# Patient Record
Sex: FEMALE | Race: WHITE | Employment: OTHER | URBAN - METROPOLITAN AREA
[De-identification: names, ages, dates, MRNs, and addresses within clinical notes are randomized per-mention and may not be internally consistent; named-entity substitution may affect disease eponyms.]

---

## 2019-05-24 ENCOUNTER — HOSPITAL ENCOUNTER (OUTPATIENT)
Dept: PREADMISSION TESTING | Age: 74
Discharge: HOME | End: 2019-05-24
Attending: OBSTETRICS & GYNECOLOGY
Payer: MEDICARE

## 2019-05-24 DIAGNOSIS — Z01.818 OTHER SPECIFIED PRE-OPERATIVE EXAMINATION: ICD-10-CM

## 2019-05-24 DIAGNOSIS — N95.0 POSTMENOPAUSAL BLEEDING: ICD-10-CM

## 2019-05-24 DIAGNOSIS — Z01.812 BLOOD TESTS PRIOR TO TREATMENT OR PROCEDURE: ICD-10-CM

## 2019-05-24 LAB
ATRIAL RATE: 72 BPM
CALCULATED P AXIS, ECG09: 79 DEGREES
CALCULATED R AXIS, ECG10: 83 DEGREES
CALCULATED T AXIS, ECG11: 90 DEGREES
DIAGNOSIS, 93000: NORMAL
HCT VFR BLD AUTO: 33.6 % (ref 35–45)
HGB BLD-MCNC: 10.3 G/DL (ref 12–16)
P-R INTERVAL, ECG05: 136 MS
POTASSIUM SERPL-SCNC: 4.7 MMOL/L (ref 3.5–5.5)
Q-T INTERVAL, ECG07: 390 MS
QRS DURATION, ECG06: 82 MS
QTC CALCULATION (BEZET), ECG08: 427 MS
VENTRICULAR RATE, ECG03: 72 BPM

## 2019-05-24 PROCEDURE — 84132 ASSAY OF SERUM POTASSIUM: CPT

## 2019-05-24 PROCEDURE — 85018 HEMOGLOBIN: CPT

## 2019-05-24 PROCEDURE — 36415 COLL VENOUS BLD VENIPUNCTURE: CPT

## 2019-05-24 PROCEDURE — 93005 ELECTROCARDIOGRAM TRACING: CPT

## 2019-05-27 LAB
FAX TO INFO,FAXT: NORMAL
FAX TO NUMBER,FAXN: NORMAL

## 2019-05-28 ENCOUNTER — HOSPITAL ENCOUNTER (OUTPATIENT)
Dept: LAB | Age: 74
Discharge: HOME | End: 2019-05-28
Payer: MEDICARE

## 2019-05-28 PROCEDURE — 88305 TISSUE EXAM BY PATHOLOGIST: CPT

## 2020-03-13 ENCOUNTER — HOSPITAL ENCOUNTER (OUTPATIENT)
Dept: PREADMISSION TESTING | Age: 75
Discharge: HOME | End: 2020-03-13
Payer: MEDICARE

## 2020-03-13 DIAGNOSIS — E04.1 NONTOXIC UNINODULAR GOITER: ICD-10-CM

## 2020-03-13 LAB
ANION GAP SERPL CALC-SCNC: 2 MMOL/L (ref 3–18)
BASOPHILS # BLD: 0 K/UL (ref 0–0.1)
BASOPHILS NFR BLD: 0 % (ref 0–2)
BUN SERPL-MCNC: 33 MG/DL (ref 7–18)
BUN/CREAT SERPL: 40 (ref 12–20)
CALCIUM SERPL-MCNC: 9.2 MG/DL (ref 8.5–10.1)
CHLORIDE SERPL-SCNC: 113 MMOL/L (ref 100–111)
CO2 SERPL-SCNC: 29 MMOL/L (ref 21–32)
CREAT SERPL-MCNC: 0.82 MG/DL (ref 0.6–1.3)
DIFFERENTIAL METHOD BLD: ABNORMAL
EOSINOPHIL # BLD: 0.1 K/UL (ref 0–0.4)
EOSINOPHIL NFR BLD: 1 % (ref 0–5)
ERYTHROCYTE [DISTWIDTH] IN BLOOD BY AUTOMATED COUNT: 14.7 % (ref 11.6–14.5)
GLUCOSE SERPL-MCNC: 96 MG/DL (ref 74–99)
HCT VFR BLD AUTO: 32.8 % (ref 35–45)
HGB BLD-MCNC: 10.1 G/DL (ref 12–16)
LYMPHOCYTES # BLD: 1 K/UL (ref 0.9–3.6)
LYMPHOCYTES NFR BLD: 18 % (ref 21–52)
MCH RBC QN AUTO: 29.7 PG (ref 24–34)
MCHC RBC AUTO-ENTMCNC: 30.8 G/DL (ref 31–37)
MCV RBC AUTO: 96.5 FL (ref 74–97)
MONOCYTES # BLD: 0.4 K/UL (ref 0.05–1.2)
MONOCYTES NFR BLD: 8 % (ref 3–10)
NEUTS SEG # BLD: 4.2 K/UL (ref 1.8–8)
NEUTS SEG NFR BLD: 73 % (ref 40–73)
PLATELET # BLD AUTO: 214 K/UL (ref 135–420)
PMV BLD AUTO: 9.2 FL (ref 9.2–11.8)
POTASSIUM SERPL-SCNC: 5.3 MMOL/L (ref 3.5–5.5)
RBC # BLD AUTO: 3.4 M/UL (ref 4.2–5.3)
SODIUM SERPL-SCNC: 144 MMOL/L (ref 136–145)
WBC # BLD AUTO: 5.7 K/UL (ref 4.6–13.2)

## 2020-03-13 PROCEDURE — 36415 COLL VENOUS BLD VENIPUNCTURE: CPT

## 2020-03-13 PROCEDURE — 80048 BASIC METABOLIC PNL TOTAL CA: CPT

## 2020-03-13 PROCEDURE — 85025 COMPLETE CBC W/AUTO DIFF WBC: CPT

## 2020-03-21 ENCOUNTER — ANESTHESIA EVENT (OUTPATIENT)
Dept: SURGERY | Age: 75
DRG: 627 | End: 2020-03-21
Payer: MEDICARE

## 2020-03-25 ENCOUNTER — HOSPITAL ENCOUNTER (OUTPATIENT)
Dept: PREADMISSION TESTING | Age: 75
Discharge: HOME | End: 2020-03-25
Payer: MEDICARE

## 2020-03-25 LAB
ATRIAL RATE: 64 BPM
CALCULATED P AXIS, ECG09: 79 DEGREES
CALCULATED R AXIS, ECG10: 84 DEGREES
CALCULATED T AXIS, ECG11: 90 DEGREES
DIAGNOSIS, 93000: NORMAL
P-R INTERVAL, ECG05: 152 MS
Q-T INTERVAL, ECG07: 392 MS
QRS DURATION, ECG06: 88 MS
QTC CALCULATION (BEZET), ECG08: 404 MS
VENTRICULAR RATE, ECG03: 64 BPM

## 2020-03-25 PROCEDURE — 93005 ELECTROCARDIOGRAM TRACING: CPT

## 2020-03-27 ENCOUNTER — HOSPITAL ENCOUNTER (INPATIENT)
Age: 75
LOS: 1 days | Discharge: HOME | DRG: 627 | End: 2020-03-29
Attending: OTOLARYNGOLOGY | Admitting: OTOLARYNGOLOGY
Payer: MEDICARE

## 2020-03-27 ENCOUNTER — ANESTHESIA (OUTPATIENT)
Dept: SURGERY | Age: 75
DRG: 627 | End: 2020-03-27
Payer: MEDICARE

## 2020-03-27 DIAGNOSIS — C73 THYROID CARCINOMA (HCC): Primary | ICD-10-CM

## 2020-03-27 PROBLEM — R09.02 HYPOXEMIA: Status: ACTIVE | Noted: 2020-03-27

## 2020-03-27 PROBLEM — I73.9 PVD (PERIPHERAL VASCULAR DISEASE) (HCC): Status: ACTIVE | Noted: 2020-03-27

## 2020-03-27 PROBLEM — K21.00 GASTROESOPHAGEAL REFLUX DISEASE WITH ESOPHAGITIS: Status: ACTIVE | Noted: 2020-03-27

## 2020-03-27 PROBLEM — Z98.890 STATUS POST THYROID SURGERY: Status: ACTIVE | Noted: 2020-03-27

## 2020-03-27 LAB
ALBUMIN SERPL-MCNC: 3 G/DL (ref 3.4–5)
ANION GAP SERPL CALC-SCNC: 5 MMOL/L (ref 3–18)
BUN SERPL-MCNC: 24 MG/DL (ref 7–18)
BUN/CREAT SERPL: 27 (ref 12–20)
CA-I SERPL-SCNC: 1.18 MMOL/L (ref 1.12–1.32)
CALCIUM SERPL-MCNC: 8.3 MG/DL (ref 8.5–10.1)
CALCIUM SERPL-MCNC: 8.4 MG/DL (ref 8.5–10.1)
CALCIUM SERPL-MCNC: 8.6 MG/DL (ref 8.5–10.1)
CHLORIDE SERPL-SCNC: 110 MMOL/L (ref 100–111)
CO2 SERPL-SCNC: 27 MMOL/L (ref 21–32)
CREAT SERPL-MCNC: 0.9 MG/DL (ref 0.6–1.3)
GLUCOSE SERPL-MCNC: 111 MG/DL (ref 74–99)
PHOSPHATE SERPL-MCNC: 4.3 MG/DL (ref 2.5–4.9)
POTASSIUM SERPL-SCNC: 4.4 MMOL/L (ref 3.5–5.5)
PTH-INTACT SERPL-MCNC: 12 PG/ML (ref 18.4–88)
SODIUM SERPL-SCNC: 142 MMOL/L (ref 136–145)

## 2020-03-27 PROCEDURE — 88311 DECALCIFY TISSUE: CPT

## 2020-03-27 PROCEDURE — 77030012407 HC DRN WND BARD -B: Performed by: OTOLARYNGOLOGY

## 2020-03-27 PROCEDURE — 74011000250 HC RX REV CODE- 250: Performed by: NURSE ANESTHETIST, CERTIFIED REGISTERED

## 2020-03-27 PROCEDURE — 74011250636 HC RX REV CODE- 250/636: Performed by: NURSE ANESTHETIST, CERTIFIED REGISTERED

## 2020-03-27 PROCEDURE — 77030018836 HC SOL IRR NACL ICUM -A: Performed by: OTOLARYNGOLOGY

## 2020-03-27 PROCEDURE — 74011250636 HC RX REV CODE- 250/636: Performed by: OTOLARYNGOLOGY

## 2020-03-27 PROCEDURE — 77030031139 HC SUT VCRL2 J&J -A: Performed by: OTOLARYNGOLOGY

## 2020-03-27 PROCEDURE — 77030031753 HC SHR ENDO COAG HARM J&J -E: Performed by: OTOLARYNGOLOGY

## 2020-03-27 PROCEDURE — 77030010512 HC APPL CLP LIG J&J -C: Performed by: OTOLARYNGOLOGY

## 2020-03-27 PROCEDURE — 74011250637 HC RX REV CODE- 250/637: Performed by: OTOLARYNGOLOGY

## 2020-03-27 PROCEDURE — 99218 HC RM OBSERVATION: CPT

## 2020-03-27 PROCEDURE — 76010000131 HC OR TIME 2 TO 2.5 HR: Performed by: OTOLARYNGOLOGY

## 2020-03-27 PROCEDURE — 94640 AIRWAY INHALATION TREATMENT: CPT

## 2020-03-27 PROCEDURE — 82310 ASSAY OF CALCIUM: CPT

## 2020-03-27 PROCEDURE — 77030040506 HC DRN WND MDII -A: Performed by: OTOLARYNGOLOGY

## 2020-03-27 PROCEDURE — 74011250637 HC RX REV CODE- 250/637: Performed by: INTERNAL MEDICINE

## 2020-03-27 PROCEDURE — 36415 COLL VENOUS BLD VENIPUNCTURE: CPT

## 2020-03-27 PROCEDURE — 0GTK0ZZ RESECTION OF THYROID GLAND, OPEN APPROACH: ICD-10-PCS | Performed by: OTOLARYNGOLOGY

## 2020-03-27 PROCEDURE — 77030020782 HC GWN BAIR PAWS FLX 3M -B: Performed by: OTOLARYNGOLOGY

## 2020-03-27 PROCEDURE — 77030013140 HC MSK NEB VYRM -A

## 2020-03-27 PROCEDURE — 74011250636 HC RX REV CODE- 250/636: Performed by: INTERNAL MEDICINE

## 2020-03-27 PROCEDURE — 82330 ASSAY OF CALCIUM: CPT

## 2020-03-27 PROCEDURE — 07B20ZZ EXCISION OF LEFT NECK LYMPHATIC, OPEN APPROACH: ICD-10-PCS | Performed by: OTOLARYNGOLOGY

## 2020-03-27 PROCEDURE — 77030002996 HC SUT SLK J&J -A: Performed by: OTOLARYNGOLOGY

## 2020-03-27 PROCEDURE — 77030040361 HC SLV COMPR DVT MDII -B: Performed by: OTOLARYNGOLOGY

## 2020-03-27 PROCEDURE — 80069 RENAL FUNCTION PANEL: CPT

## 2020-03-27 PROCEDURE — 77030008462 HC STPLR SKN PROX J&J -A: Performed by: OTOLARYNGOLOGY

## 2020-03-27 PROCEDURE — 77030002916 HC SUT ETHLN J&J -A: Performed by: OTOLARYNGOLOGY

## 2020-03-27 PROCEDURE — 83970 ASSAY OF PARATHORMONE: CPT

## 2020-03-27 PROCEDURE — 07B10ZZ EXCISION OF RIGHT NECK LYMPHATIC, OPEN APPROACH: ICD-10-PCS | Performed by: OTOLARYNGOLOGY

## 2020-03-27 PROCEDURE — 74011000250 HC RX REV CODE- 250: Performed by: OTOLARYNGOLOGY

## 2020-03-27 PROCEDURE — 74011000272 HC RX REV CODE- 272: Performed by: OTOLARYNGOLOGY

## 2020-03-27 PROCEDURE — 74011000250 HC RX REV CODE- 250: Performed by: INTERNAL MEDICINE

## 2020-03-27 PROCEDURE — 76210000006 HC OR PH I REC 0.5 TO 1 HR: Performed by: OTOLARYNGOLOGY

## 2020-03-27 PROCEDURE — 76060000035 HC ANESTHESIA 2 TO 2.5 HR: Performed by: OTOLARYNGOLOGY

## 2020-03-27 PROCEDURE — 88307 TISSUE EXAM BY PATHOLOGIST: CPT

## 2020-03-27 RX ORDER — AMLODIPINE BESYLATE 5 MG/1
5 TABLET ORAL
Status: COMPLETED | OUTPATIENT
Start: 2020-03-27 | End: 2020-03-27

## 2020-03-27 RX ORDER — FERROUS SULFATE, DRIED 160(50) MG
1 TABLET, EXTENDED RELEASE ORAL DAILY
Status: DISCONTINUED | OUTPATIENT
Start: 2020-03-27 | End: 2020-03-29 | Stop reason: HOSPADM

## 2020-03-27 RX ORDER — MIDAZOLAM HYDROCHLORIDE 1 MG/ML
INJECTION, SOLUTION INTRAMUSCULAR; INTRAVENOUS AS NEEDED
Status: DISCONTINUED | OUTPATIENT
Start: 2020-03-27 | End: 2020-03-27 | Stop reason: HOSPADM

## 2020-03-27 RX ORDER — HYDROMORPHONE HYDROCHLORIDE 1 MG/ML
0.2 INJECTION, SOLUTION INTRAMUSCULAR; INTRAVENOUS; SUBCUTANEOUS
Status: DISCONTINUED | OUTPATIENT
Start: 2020-03-27 | End: 2020-03-29 | Stop reason: HOSPADM

## 2020-03-27 RX ORDER — PHENYLEPHRINE HCL IN 0.9% NACL 1 MG/10 ML
SYRINGE (ML) INTRAVENOUS AS NEEDED
Status: DISCONTINUED | OUTPATIENT
Start: 2020-03-27 | End: 2020-03-27 | Stop reason: HOSPADM

## 2020-03-27 RX ORDER — ATORVASTATIN CALCIUM 20 MG/1
40 TABLET, FILM COATED ORAL
Status: DISCONTINUED | OUTPATIENT
Start: 2020-03-27 | End: 2020-03-29 | Stop reason: HOSPADM

## 2020-03-27 RX ORDER — SODIUM CHLORIDE 0.9 % (FLUSH) 0.9 %
5-40 SYRINGE (ML) INJECTION EVERY 8 HOURS
Status: DISCONTINUED | OUTPATIENT
Start: 2020-03-27 | End: 2020-03-29 | Stop reason: HOSPADM

## 2020-03-27 RX ORDER — AMLODIPINE BESYLATE 5 MG/1
5 TABLET ORAL DAILY
Status: DISCONTINUED | OUTPATIENT
Start: 2020-03-27 | End: 2020-03-27

## 2020-03-27 RX ORDER — DEXAMETHASONE SODIUM PHOSPHATE 4 MG/ML
INJECTION, SOLUTION INTRA-ARTICULAR; INTRALESIONAL; INTRAMUSCULAR; INTRAVENOUS; SOFT TISSUE AS NEEDED
Status: DISCONTINUED | OUTPATIENT
Start: 2020-03-27 | End: 2020-03-27 | Stop reason: HOSPADM

## 2020-03-27 RX ORDER — MAGNESIUM SULFATE 100 %
4 CRYSTALS MISCELLANEOUS AS NEEDED
Status: DISCONTINUED | OUTPATIENT
Start: 2020-03-27 | End: 2020-03-27 | Stop reason: HOSPADM

## 2020-03-27 RX ORDER — CALCIUM CARBONATE 500(1250)
1500 TABLET ORAL
Status: DISCONTINUED | OUTPATIENT
Start: 2020-03-27 | End: 2020-03-29 | Stop reason: HOSPADM

## 2020-03-27 RX ORDER — AMLODIPINE BESYLATE 5 MG/1
5 TABLET ORAL DAILY
Status: DISCONTINUED | OUTPATIENT
Start: 2020-03-28 | End: 2020-03-29 | Stop reason: HOSPADM

## 2020-03-27 RX ORDER — HYDRALAZINE HYDROCHLORIDE 20 MG/ML
10 INJECTION INTRAMUSCULAR; INTRAVENOUS
Status: DISCONTINUED | OUTPATIENT
Start: 2020-03-27 | End: 2020-03-29 | Stop reason: HOSPADM

## 2020-03-27 RX ORDER — FENTANYL CITRATE 50 UG/ML
INJECTION, SOLUTION INTRAMUSCULAR; INTRAVENOUS AS NEEDED
Status: DISCONTINUED | OUTPATIENT
Start: 2020-03-27 | End: 2020-03-27 | Stop reason: HOSPADM

## 2020-03-27 RX ORDER — HYDROMORPHONE HYDROCHLORIDE 1 MG/ML
0.5 INJECTION, SOLUTION INTRAMUSCULAR; INTRAVENOUS; SUBCUTANEOUS
Status: DISCONTINUED | OUTPATIENT
Start: 2020-03-27 | End: 2020-03-27 | Stop reason: HOSPADM

## 2020-03-27 RX ORDER — NALOXONE HYDROCHLORIDE 0.4 MG/ML
0.4 INJECTION, SOLUTION INTRAMUSCULAR; INTRAVENOUS; SUBCUTANEOUS AS NEEDED
Status: DISCONTINUED | OUTPATIENT
Start: 2020-03-27 | End: 2020-03-29 | Stop reason: HOSPADM

## 2020-03-27 RX ORDER — GLYCOPYRROLATE 0.2 MG/ML
INJECTION INTRAMUSCULAR; INTRAVENOUS AS NEEDED
Status: DISCONTINUED | OUTPATIENT
Start: 2020-03-27 | End: 2020-03-27 | Stop reason: HOSPADM

## 2020-03-27 RX ORDER — FENTANYL CITRATE 50 UG/ML
25 INJECTION, SOLUTION INTRAMUSCULAR; INTRAVENOUS AS NEEDED
Status: DISCONTINUED | OUTPATIENT
Start: 2020-03-27 | End: 2020-03-27 | Stop reason: HOSPADM

## 2020-03-27 RX ORDER — CARVEDILOL 3.12 MG/1
6.25 TABLET ORAL
Status: COMPLETED | OUTPATIENT
Start: 2020-03-27 | End: 2020-03-27

## 2020-03-27 RX ORDER — FLUMAZENIL 0.1 MG/ML
0.2 INJECTION INTRAVENOUS
Status: DISCONTINUED | OUTPATIENT
Start: 2020-03-27 | End: 2020-03-27 | Stop reason: HOSPADM

## 2020-03-27 RX ORDER — ROCURONIUM BROMIDE 10 MG/ML
INJECTION, SOLUTION INTRAVENOUS AS NEEDED
Status: DISCONTINUED | OUTPATIENT
Start: 2020-03-27 | End: 2020-03-27 | Stop reason: HOSPADM

## 2020-03-27 RX ORDER — LIDOCAINE HYDROCHLORIDE 20 MG/ML
INJECTION, SOLUTION EPIDURAL; INFILTRATION; INTRACAUDAL; PERINEURAL AS NEEDED
Status: DISCONTINUED | OUTPATIENT
Start: 2020-03-27 | End: 2020-03-27 | Stop reason: HOSPADM

## 2020-03-27 RX ORDER — FERROUS SULFATE, DRIED 160(50) MG
1 TABLET, EXTENDED RELEASE ORAL DAILY
Status: DISCONTINUED | OUTPATIENT
Start: 2020-03-28 | End: 2020-03-27

## 2020-03-27 RX ORDER — SODIUM CHLORIDE 0.9 % (FLUSH) 0.9 %
5-40 SYRINGE (ML) INJECTION EVERY 8 HOURS
Status: DISCONTINUED | OUTPATIENT
Start: 2020-03-27 | End: 2020-03-27 | Stop reason: HOSPADM

## 2020-03-27 RX ORDER — CALCIUM CARBONATE 500(1250)
1500 TABLET ORAL
Status: DISCONTINUED | OUTPATIENT
Start: 2020-03-28 | End: 2020-03-27

## 2020-03-27 RX ORDER — NALOXONE HYDROCHLORIDE 0.4 MG/ML
0.1 INJECTION, SOLUTION INTRAMUSCULAR; INTRAVENOUS; SUBCUTANEOUS AS NEEDED
Status: DISCONTINUED | OUTPATIENT
Start: 2020-03-27 | End: 2020-03-27 | Stop reason: HOSPADM

## 2020-03-27 RX ORDER — LEVOTHYROXINE SODIUM 100 UG/1
100 TABLET ORAL DAILY
Status: DISCONTINUED | OUTPATIENT
Start: 2020-03-27 | End: 2020-03-29 | Stop reason: HOSPADM

## 2020-03-27 RX ORDER — SODIUM CHLORIDE, SODIUM LACTATE, POTASSIUM CHLORIDE, CALCIUM CHLORIDE 600; 310; 30; 20 MG/100ML; MG/100ML; MG/100ML; MG/100ML
125 INJECTION, SOLUTION INTRAVENOUS CONTINUOUS
Status: DISCONTINUED | OUTPATIENT
Start: 2020-03-27 | End: 2020-03-27

## 2020-03-27 RX ORDER — IPRATROPIUM BROMIDE AND ALBUTEROL SULFATE 2.5; .5 MG/3ML; MG/3ML
3 SOLUTION RESPIRATORY (INHALATION)
Status: DISCONTINUED | OUTPATIENT
Start: 2020-03-27 | End: 2020-03-29 | Stop reason: HOSPADM

## 2020-03-27 RX ORDER — CARVEDILOL 3.12 MG/1
6.25 TABLET ORAL 2 TIMES DAILY WITH MEALS
Status: DISCONTINUED | OUTPATIENT
Start: 2020-03-27 | End: 2020-03-29 | Stop reason: HOSPADM

## 2020-03-27 RX ORDER — ONDANSETRON 2 MG/ML
4 INJECTION INTRAMUSCULAR; INTRAVENOUS
Status: DISCONTINUED | OUTPATIENT
Start: 2020-03-27 | End: 2020-03-28

## 2020-03-27 RX ORDER — IPRATROPIUM BROMIDE AND ALBUTEROL SULFATE 2.5; .5 MG/3ML; MG/3ML
3 SOLUTION RESPIRATORY (INHALATION) 2 TIMES DAILY
Status: DISCONTINUED | OUTPATIENT
Start: 2020-03-27 | End: 2020-03-27

## 2020-03-27 RX ORDER — GABAPENTIN 100 MG/1
100 CAPSULE ORAL 3 TIMES DAILY
Status: DISCONTINUED | OUTPATIENT
Start: 2020-03-27 | End: 2020-03-29 | Stop reason: HOSPADM

## 2020-03-27 RX ORDER — SODIUM CHLORIDE, SODIUM LACTATE, POTASSIUM CHLORIDE, CALCIUM CHLORIDE 600; 310; 30; 20 MG/100ML; MG/100ML; MG/100ML; MG/100ML
1000 INJECTION, SOLUTION INTRAVENOUS CONTINUOUS
Status: DISCONTINUED | OUTPATIENT
Start: 2020-03-27 | End: 2020-03-27 | Stop reason: HOSPADM

## 2020-03-27 RX ORDER — HYDROCODONE BITARTRATE AND ACETAMINOPHEN 5; 325 MG/1; MG/1
1 TABLET ORAL
Status: DISCONTINUED | OUTPATIENT
Start: 2020-03-27 | End: 2020-03-29 | Stop reason: HOSPADM

## 2020-03-27 RX ORDER — LANOLIN ALCOHOL/MO/W.PET/CERES
400 CREAM (GRAM) TOPICAL DAILY
Status: DISCONTINUED | OUTPATIENT
Start: 2020-03-28 | End: 2020-03-29 | Stop reason: HOSPADM

## 2020-03-27 RX ORDER — SODIUM CHLORIDE 0.9 % (FLUSH) 0.9 %
5-40 SYRINGE (ML) INJECTION AS NEEDED
Status: DISCONTINUED | OUTPATIENT
Start: 2020-03-27 | End: 2020-03-27 | Stop reason: HOSPADM

## 2020-03-27 RX ORDER — SODIUM CHLORIDE 0.9 % (FLUSH) 0.9 %
5-40 SYRINGE (ML) INJECTION AS NEEDED
Status: DISCONTINUED | OUTPATIENT
Start: 2020-03-27 | End: 2020-03-29 | Stop reason: HOSPADM

## 2020-03-27 RX ORDER — DEXMEDETOMIDINE HYDROCHLORIDE 100 UG/ML
INJECTION, SOLUTION INTRAVENOUS AS NEEDED
Status: DISCONTINUED | OUTPATIENT
Start: 2020-03-27 | End: 2020-03-27 | Stop reason: HOSPADM

## 2020-03-27 RX ORDER — SODIUM CHLORIDE, SODIUM LACTATE, POTASSIUM CHLORIDE, CALCIUM CHLORIDE 600; 310; 30; 20 MG/100ML; MG/100ML; MG/100ML; MG/100ML
75 INJECTION, SOLUTION INTRAVENOUS CONTINUOUS
Status: DISCONTINUED | OUTPATIENT
Start: 2020-03-27 | End: 2020-03-29 | Stop reason: HOSPADM

## 2020-03-27 RX ORDER — ONDANSETRON 2 MG/ML
INJECTION INTRAMUSCULAR; INTRAVENOUS AS NEEDED
Status: DISCONTINUED | OUTPATIENT
Start: 2020-03-27 | End: 2020-03-27 | Stop reason: HOSPADM

## 2020-03-27 RX ORDER — NEOSTIGMINE METHYLSULFATE 1 MG/ML
INJECTION, SOLUTION INTRAVENOUS AS NEEDED
Status: DISCONTINUED | OUTPATIENT
Start: 2020-03-27 | End: 2020-03-27 | Stop reason: HOSPADM

## 2020-03-27 RX ORDER — PANTOPRAZOLE SODIUM 40 MG/1
40 TABLET, DELAYED RELEASE ORAL
Status: DISCONTINUED | OUTPATIENT
Start: 2020-03-28 | End: 2020-03-29 | Stop reason: HOSPADM

## 2020-03-27 RX ORDER — BACITRACIN 500 [USP'U]/G
OINTMENT TOPICAL AS NEEDED
Status: DISCONTINUED | OUTPATIENT
Start: 2020-03-27 | End: 2020-03-27 | Stop reason: HOSPADM

## 2020-03-27 RX ORDER — PROPOFOL 10 MG/ML
INJECTION, EMULSION INTRAVENOUS AS NEEDED
Status: DISCONTINUED | OUTPATIENT
Start: 2020-03-27 | End: 2020-03-27 | Stop reason: HOSPADM

## 2020-03-27 RX ORDER — HYDROMORPHONE HYDROCHLORIDE 2 MG/ML
INJECTION, SOLUTION INTRAMUSCULAR; INTRAVENOUS; SUBCUTANEOUS AS NEEDED
Status: DISCONTINUED | OUTPATIENT
Start: 2020-03-27 | End: 2020-03-27 | Stop reason: HOSPADM

## 2020-03-27 RX ORDER — DOCUSATE SODIUM 100 MG/1
100 CAPSULE, LIQUID FILLED ORAL DAILY
Status: DISCONTINUED | OUTPATIENT
Start: 2020-03-27 | End: 2020-03-29 | Stop reason: HOSPADM

## 2020-03-27 RX ORDER — VENLAFAXINE 50 MG/1
25 TABLET ORAL 2 TIMES DAILY WITH MEALS
Status: DISCONTINUED | OUTPATIENT
Start: 2020-03-27 | End: 2020-03-29 | Stop reason: HOSPADM

## 2020-03-27 RX ORDER — ATORVASTATIN CALCIUM 20 MG/1
40 TABLET, FILM COATED ORAL DAILY
Status: DISCONTINUED | OUTPATIENT
Start: 2020-03-27 | End: 2020-03-27

## 2020-03-27 RX ADMIN — ATORVASTATIN CALCIUM 40 MG: 20 TABLET, FILM COATED ORAL at 21:38

## 2020-03-27 RX ADMIN — Medication 100 MCG: at 07:53

## 2020-03-27 RX ADMIN — HYDROCODONE BITARTRATE AND ACETAMINOPHEN 1 TABLET: 5; 325 TABLET ORAL at 13:07

## 2020-03-27 RX ADMIN — Medication 10 MG: at 09:24

## 2020-03-27 RX ADMIN — Medication 10 MG: at 09:01

## 2020-03-27 RX ADMIN — LIDOCAINE HYDROCHLORIDE 50 MG: 20 INJECTION, SOLUTION EPIDURAL; INFILTRATION; INTRACAUDAL; PERINEURAL at 07:36

## 2020-03-27 RX ADMIN — IPRATROPIUM BROMIDE AND ALBUTEROL SULFATE 3 ML: .5; 3 SOLUTION RESPIRATORY (INHALATION) at 20:08

## 2020-03-27 RX ADMIN — GABAPENTIN 100 MG: 100 CAPSULE ORAL at 17:12

## 2020-03-27 RX ADMIN — Medication 10 MG: at 08:45

## 2020-03-27 RX ADMIN — Medication 10 ML: at 13:08

## 2020-03-27 RX ADMIN — FENTANYL CITRATE 50 MCG: 50 INJECTION, SOLUTION INTRAMUSCULAR; INTRAVENOUS at 07:58

## 2020-03-27 RX ADMIN — VENLAFAXINE 25 MG: 50 TABLET ORAL at 17:12

## 2020-03-27 RX ADMIN — DEXAMETHASONE SODIUM PHOSPHATE 4 MG: 4 INJECTION, SOLUTION INTRAMUSCULAR; INTRAVENOUS at 07:49

## 2020-03-27 RX ADMIN — GABAPENTIN 100 MG: 100 CAPSULE ORAL at 21:42

## 2020-03-27 RX ADMIN — Medication 10 MG: at 08:31

## 2020-03-27 RX ADMIN — HYDROMORPHONE HYDROCHLORIDE 0.5 MG: 2 INJECTION, SOLUTION INTRAMUSCULAR; INTRAVENOUS; SUBCUTANEOUS at 08:04

## 2020-03-27 RX ADMIN — DEXMEDETOMIDINE HYDROCHLORIDE 10 MCG: 100 INJECTION, SOLUTION INTRAVENOUS at 08:51

## 2020-03-27 RX ADMIN — HYDRALAZINE HYDROCHLORIDE 10 MG: 20 INJECTION INTRAMUSCULAR; INTRAVENOUS at 21:46

## 2020-03-27 RX ADMIN — Medication 100 MCG: at 09:42

## 2020-03-27 RX ADMIN — ONDANSETRON 4 MG: 2 INJECTION INTRAMUSCULAR; INTRAVENOUS at 22:47

## 2020-03-27 RX ADMIN — Medication 1500 MG: at 21:39

## 2020-03-27 RX ADMIN — Medication 10 ML: at 21:45

## 2020-03-27 RX ADMIN — Medication 1 TABLET: at 21:39

## 2020-03-27 RX ADMIN — GLYCOPYRROLATE 0.4 MG: 0.2 INJECTION INTRAMUSCULAR; INTRAVENOUS at 09:45

## 2020-03-27 RX ADMIN — Medication 100 MCG: at 09:31

## 2020-03-27 RX ADMIN — Medication 100 MCG: at 08:11

## 2020-03-27 RX ADMIN — FENTANYL CITRATE 50 MCG: 50 INJECTION, SOLUTION INTRAMUSCULAR; INTRAVENOUS at 07:35

## 2020-03-27 RX ADMIN — SODIUM CHLORIDE, SODIUM LACTATE, POTASSIUM CHLORIDE, AND CALCIUM CHLORIDE 75 ML/HR: 600; 310; 30; 20 INJECTION, SOLUTION INTRAVENOUS at 12:00

## 2020-03-27 RX ADMIN — SODIUM CHLORIDE, SODIUM LACTATE, POTASSIUM CHLORIDE, AND CALCIUM CHLORIDE 125 ML/HR: 600; 310; 30; 20 INJECTION, SOLUTION INTRAVENOUS at 06:37

## 2020-03-27 RX ADMIN — LEVOTHYROXINE SODIUM 100 MCG: 0.1 TABLET ORAL at 13:07

## 2020-03-27 RX ADMIN — HYDROCODONE BITARTRATE AND ACETAMINOPHEN 1 TABLET: 5; 325 TABLET ORAL at 22:17

## 2020-03-27 RX ADMIN — HYDROMORPHONE HYDROCHLORIDE 0.5 MG: 2 INJECTION, SOLUTION INTRAMUSCULAR; INTRAVENOUS; SUBCUTANEOUS at 08:24

## 2020-03-27 RX ADMIN — PROPOFOL 120 MG: 10 INJECTION, EMULSION INTRAVENOUS at 07:36

## 2020-03-27 RX ADMIN — MIDAZOLAM 1 MG: 1 INJECTION INTRAMUSCULAR; INTRAVENOUS at 07:33

## 2020-03-27 RX ADMIN — Medication 40 MG: at 07:36

## 2020-03-27 RX ADMIN — MIDAZOLAM 1 MG: 1 INJECTION INTRAMUSCULAR; INTRAVENOUS at 07:31

## 2020-03-27 RX ADMIN — DOCUSATE SODIUM 100 MG: 100 CAPSULE, LIQUID FILLED ORAL at 12:00

## 2020-03-27 RX ADMIN — HYDROCODONE BITARTRATE AND ACETAMINOPHEN 1 TABLET: 5; 325 TABLET ORAL at 17:15

## 2020-03-27 RX ADMIN — ONDANSETRON HYDROCHLORIDE 4 MG: 2 INJECTION INTRAMUSCULAR; INTRAVENOUS at 09:39

## 2020-03-27 RX ADMIN — CARVEDILOL 6.25 MG: 3.12 TABLET, FILM COATED ORAL at 23:29

## 2020-03-27 RX ADMIN — CARVEDILOL 6.25 MG: 3.12 TABLET, FILM COATED ORAL at 17:12

## 2020-03-27 RX ADMIN — GLYCOPYRROLATE 0.2 MG: 0.2 INJECTION INTRAMUSCULAR; INTRAVENOUS at 08:09

## 2020-03-27 RX ADMIN — Medication 100 MCG: at 09:00

## 2020-03-27 RX ADMIN — SODIUM CHLORIDE, SODIUM LACTATE, POTASSIUM CHLORIDE, AND CALCIUM CHLORIDE: 600; 310; 30; 20 INJECTION, SOLUTION INTRAVENOUS at 08:27

## 2020-03-27 RX ADMIN — AMLODIPINE BESYLATE 5 MG: 5 TABLET ORAL at 23:29

## 2020-03-27 RX ADMIN — Medication 2 MG: at 09:45

## 2020-03-27 NOTE — PERIOP NOTES
TRANSFER - OUT REPORT:    Verbal report given to Pauline Apgar, RN(name) on Daria  being transferred to ICU(unit) for routine post - op       Report consisted of patients Situation, Background, Assessment and   Recommendations(SBAR). Information from the following report(s) SBAR, Kardex, OR Summary, Procedure Summary, Intake/Output and MAR was reviewed with the receiving nurse. Lines:   Peripheral IV 20 Left Forearm (Active)   Site Assessment Clean, dry, & intact 3/27/2020 10:35 AM   Phlebitis Assessment 0 3/27/2020 10:35 AM   Infiltration Assessment 0 3/27/2020 10:35 AM   Dressing Status Clean, dry, & intact 3/27/2020 10:35 AM   Dressing Type Transparent;Tape 3/27/2020 10:35 AM   Hub Color/Line Status Pink; Infusing 3/27/2020 10:35 AM        Opportunity for questions and clarification was provided.       Patient transported with:   Registered Nurse

## 2020-03-27 NOTE — CONSULTS
[facility]  Pulmonary, Critical Care, and Sleep Medicine    Name: New Yuen MRN: [id number]   : 1945 Hospital: [facility] [place]   Date: 3/27/2020        Pulmonary Critical Care Consult    IMPRESSION:   Patient Active Problem List   Diagnosis Code    Thyroid carcinoma ([facility] [address] C73    Hypoxemia R09.02    PVD (peripheral vascular disease) ([facility]) I73.9    Gastroesophageal reflux disease with esophagitis K21.0    Status post thyroid surgery Z98.890 ·     · Neuro: PRN pain medications, +/- sedation avoid oversedation monitor respiratory status   · CVS:  Post op give IVf ib hypotension. Monitor urine output HD stable; Monitor CVP, Actively titrate vasopressors aim MAP >65mmHg, Check cardiac panel, ECHO results. · Resp: Titrate FiO2/ supp O2 for SpO2 >90%; now on 2L of oxygen ex smoker add bronchodilators and incentive spirometry   · I/D: Afebrile; aleukocytosis; Sepsis bundle per hospital protocol, f/u BxCx/UC, Sputum Cx's. LA ordered- initial and repeat Q4hrs till normalized. ABX :prophylaxis  Deescalate ABX once Cx's finalize. · Hem/Onc: Daily CBC; H/H, and plts are stable  · Metabolic: monitor closely electrolytes especially calcium  Daily BMP; monitor e-lytes; replace PRN  · Renal:  Renal pabnel   · Endocrine: POC Glucose q6; Check TSH level  · GI: SUP, Trend LFTs, Zofran PRN for N/V   · Musc/Skin: No acute issues, wound care  · Fluids: NACL   · Code Status:full code    RECOMMENDATIONS:   Male postop. Responding to 2 L of oxygen add incentive spirometry. Bronchodilators. Ex-smoker. Chest x-ray in a.m. Status post thyroidectomy. Tolerated procedure well. No significant bleeding after postop. Continue bronchodilators, pulmonary hygiene care    Sepsis bundle per hospital protocol  Antibiotic choice: ancef post op   Cultures drawn and will be followed. Lactic acid ordered- initial and repeat Q6hrs if elevated till normalized.   Fluids:NACL  Actively titrate vasopressors aim MAP >65mmHg  Check cardiac panel, ECHO  Diuresis prn   Monitor TSH    Glycemic control  Stress ulcer prophylaxis  DVT prophylaxis  AM labs  Diet: DIET CLEAR LIQUID after speech evaluation   Palliative care consult  Anand Bundle Followed    Will defer respective systems problem management to primary and other consultant and follow patient in ICU with primary and other medical team  Further recommendations will be based on the patient's response to recommended treatment and results of the investigation ordered. Quality Care: PPI, DVT prophylaxis, HOB elevated, Infection control all reviewed and addressed. PAIN AND SEDATION:   · Skin/Wound:  postv trach   · Nutrition:  speech eval  · Prophylaxis: DVT and GI Prophylaxis reviewed. · Restraints:  None   · PT/OT eval and treat:   · Lines/Tubes: lines  Peripheral iv     FAMILY DISCUSSION:  Full code   Events and notes from last 24 hours reviewed. Care plan discussed with nursing      Subjective/History:   Charlotte Min has been seen and evaluated as postop. Close monitoring. After thyroidectomy. 72-year-old female with past history of htn/GERD peripheral vascular disease, history of smoking in the past stopped 20 years ago. History of thyroid nodules with increasing in size. Suspected thyroid cancer. Today status post thyroidectomy. Mild hypoxemia postop. Next now awake and alert. Very minimal bleeding from the thyroidectomy site. Speech evaluation before takes p.o. Review of Systems:  A comprehensive review of systems was negative except for that written in the HPI.     []The patient is unable to give any meaningful history or review of systems because the patient is:  []Intubated []Sedated   []Unresponsive []Unable to comprehend     []The patient is critically ill on      []Mechanical ventilation []Pressors   []BiPAP []               Latest lactic acid: No results found for: LAC    Completed IVF Resuscitation (30ml/kg) - yes  IVF choice: 0.9NS    Suspected source/s of severe sepsis: none     Organ dysfunction: No    Antibiotics:  Ancef post op     Completed physical exam: yes    Past Medical History:  Past Medical History:   Diagnosis Date    Arthritis     CAD (coronary artery disease)     stents in legs    GERD (gastroesophageal reflux disease)     Hypercholesteremia     Hypertension     age 38    Psychiatric disorder     anxiety and depression    Stroke ([facility] [address]     tia *4        Past Surgical History:  Past Surgical History:   Procedure Laterality Date    HX APPENDECTOMY      HX CHOLECYSTECTOMY      HX ORTHOPAEDIC      neck fabian    HX TONSILLECTOMY      HX TUBAL LIGATION      VASCULAR SURGERY PROCEDURE UNLIST      stents in legs        Medications:  Prior to Admission medications    Medication Sig Start Date End Date Taking? Authorizing Provider   [facility] 75 mg tablet Take  by mouth two (2) times a day. 7/1/14  Yes Provider, Historical   omeprazole (PRILOSEC) 40 mg capsule Take 40 mg by mouth daily. Yes Provider, Historical   Magnesium Oxide 500 mg cap Take  by mouth daily. Yes Provider, Historical   gabapentin (NEURONTIN) 100 mg capsule Take 100 mg by mouth three (3) times daily. 3/26/19  Yes Provider, Historical   docusate sodium (COLACE) 100 mg capsule Take 100 mg by mouth daily. 10/3/18  Yes Provider, Historical   cyanocobalamin 1,000 mcg tablet Take 1,000 mcg by mouth daily. Yes Provider, Historical   carvediloL (COREG) 6.25 mg tablet Take 6.25 mg by mouth two (2) times daily (with meals). 12/20/17  Yes Provider, Historical   calcium-vitamin D3-vitamin K 500 mg-1,000 unit-40 mcg chew Take 1 Tab by mouth daily. 7/1/14  Yes Provider, Historical   atorvastatin (LIPITOR) 40 mg tablet Take 40 mg by mouth nightly. Yes Provider, Historical   amLODIPine (NORVASC) 5 mg tablet Take 5 mg by mouth daily.  12/21/17  Yes Provider, Historical   clopidogreL (PLAVIX) 75 mg tab Indications: stents in leg 9/28/19   Provider, Historical   aspirin-calcium carbonate 81 mg-300 mg calcium(777 mg) tab Take 81 mg by mouth daily. 14   Provider, Historical       Current Facility-Administered Medications   Medication Dose Route Frequency    lactated Ringers infusion  75 mL/hr IntraVENous CONTINUOUS    [START ON 3/28/2020] calcium-vitamin D (OS-FACUNDO) 500 mg-200 unit tablet  1 Tab Oral DAILY    carvediloL (COREG) tablet 6.25 mg  6.25 mg Oral BID WITH MEALS    docusate sodium (COLACE) capsule 100 mg  100 mg Oral DAILY    gabapentin (NEURONTIN) capsule 100 mg  100 mg Oral TID    [START ON 3/28/2020] magnesium oxide (MAG-OX) tablet 400 mg  400 mg Oral DAILY    [START ON 3/28/2020] pantoprazole (PROTONIX) tablet 40 mg  40 mg Oral ACB    venlafaxine (EFFEXOR) tablet 25 mg  25 mg Oral BID WITH MEALS    sodium chloride (NS) flush 5-40 mL  5-40 mL IntraVENous Q8H    levothyroxine (SYNTHROID) tablet 100 mcg  100 mcg Oral DAILY    [START ON 3/28/2020] amLODIPine (NORVASC) tablet 5 mg  5 mg Oral DAILY    atorvastatin (LIPITOR) tablet 40 mg  40 mg Oral QHS       Allergy:  Allergies   Allergen Reactions    Sulfamethoxazole Other (comments)    Trimethoprim Other (comments)    Promethazine Other (comments)     Makes me crazy        Social History:  Social History     Tobacco Use    Smoking status: Former Smoker    Smokeless tobacco: Never Used   Substance Use Topics    Alcohol use: Never     Frequency: Never    Drug use: Never        Family History:  History reviewed. No pertinent family history. Objective:   Vital Signs:    Blood pressure 146/71, pulse 66, temperature 97.3 °F (36.3 °C), resp. rate 14, height 5' 2\" (1.575 m), weight 51.9 kg (114 lb 7 oz), SpO2 99 %. Body mass index is 20.93 kg/m².    O2 Device: Nasal cannula   O2 Flow Rate (L/min): 2 l/min   Temp (24hrs), Av.3 °F (36.3 °C), Min:97.1 °F (36.2 °C), Max:97.7 °F (36.5 °C)       Patient Vitals for the past 8 hrs:   Temp Pulse Resp BP SpO2   20 1245 -- 66 14 -- 99 %   03/27/20 1230 -- 71 11 -- 99 %   03/27/20 1220 97.3 °F (36.3 °C) -- -- -- --   03/27/20 1215 -- 67 15 -- 98 %   03/27/20 1200 -- 64 13 146/71 98 %   03/27/20 1145 -- 67 14 -- 98 %   03/27/20 1130 -- 69 13 -- 98 %   03/27/20 1055 97.1 °F (36.2 °C) 69 -- -- 96 %   03/27/20 1050 -- 64 (!) 6 126/66 96 %   03/27/20 1045 -- 66 (!) 7 140/68 95 %   03/27/20 1041 -- 65 10 -- 96 %   03/27/20 1040 -- 64 (!) 7 133/67 96 %   03/27/20 1035 -- 63 (!) 6 136/67 96 %   03/27/20 1030 -- 68 -- 150/69 96 %   03/27/20 1025 -- 63 11 144/78 99 %   03/27/20 1020 -- 66 10 144/72 99 %   03/27/20 1015 -- 70 16 137/74 99 %   03/27/20 1010 -- 69 8 146/74 99 %   03/27/20 1005 97.2 °F (36.2 °C) 74 (!) 6 160/85 99 %   03/27/20 0557 97.7 °F (36.5 °C) 70 16 191/88 100 %     Intake/Output:   Last shift:      03/27 0701 - 03/27 1900  In: 1500 [I.V.:1500]  Out: 25   Last 3 shifts: No intake/output data recorded.     Intake/Output Summary (Last 24 hours) at 3/27/2020 1332  Last data filed at 3/27/2020 0941  Gross per 24 hour   Intake 1500 ml   Output 25 ml   Net 1475 ml       Physical Exam:  General: Alert and oriented to all spheres, well developed and well nourished, fatigued, cooperative, no distress, appears stated age  [de-identified]:     PERRLA, EOMI, fundi benign, ENT exam normal, no neck nodes or sinus tenderness  Neck: neck post thyroidectomy  Drains in place minimal bleeding   Chest: normal  Lungs: normal air entry, breathing normal , clear to auscultation bilaterally, no tenderness/ rash  Heart: Regular rate and rhythm  Abdomen: non distended, bowel sounds normoactive, tympanic, abdomen is soft without significant tenderness, masses, organomegaly or guarding, rigidity, rebound  Extremity: negative  Capillary refill: normal  Neuro: responds to voice, alert, oriented x 3, no defects noted in general exam.  Skin: Skin color, texture, turgor normal. No rashes or lesions or Skin color, texture, turgor normal    Data:   No results found for this or any previous visit (from the past 24 hour(s)). No results for input(s): FIO2I, IFO2, HCO3I, IHCO3, HCOPOC, PCO2I, PCOPOC, IPHI, PHI, PHPOC, PO2I, PO2POC in the last 72 hours. No lab exists for component: IPOC2    All Micro Results     None          Telemetry: normal sinus rhythm     ECHO      Imaging:  [x]I have personally reviewed the patients chest radiographs images and report     No results found for this or any previous visit. No results found for this or any previous visit. [x]See my orders for details    My assessment, plan of care, findings, medications, side effects etc were discussed with:  [x]nursing []PT/OT    []respiratory therapy []Dr. Nickolas Valdez []Patient     [x]Total critical care time exclusive of procedures 45 minutes with complex decision making performed and > 50% time spent in face to face evaluation.     Fiordaliza Alcantar MD

## 2020-03-27 NOTE — PROGRESS NOTES
Reason for Admission:  Observation following thyroidectomy and central neck dissection                   RUR Score:   N/A                  Plan for utilizing home health:  No recommendation at this time,pt plans to return back home with family assistance       PCP: First and Last name:  Kennedy Kocher   Name of Practice:    Are you a current patient: Yes/No:    Approximate date of last visit:                     Current Advanced Directive/Advance Care Plan: not on chart                         Transition of Care Plan:  Chart reviewed pt was transferred from PACU to icu for further close monitoring after OR procedure,no cm needs at this time, if needed weekend cm will be available thru  for immediate needs,due to ICU visitor restriction and to prevent any possible exposure to COVID-19 virus cm will not be visiting with pt,nurse Obdulio Sargent was informed of patients observation status and made aware that observation letter will be faxed to unit and to please provide to pt along that if pt has any questions she can contact medicare number provided on form, weekend cm will be available for immediate needs. Care Management Interventions  PCP Verified by CM:  Yes  Palliative Care Criteria Met (RRAT>21 & CHF Dx)?: No  Current Support Network: Lives with Spouse  Confirm Follow Up Transport: Family  The Plan for Transition of Care is Related to the Following Treatment Goals : pt will be returning back home with family assistance

## 2020-03-27 NOTE — BRIEF OP NOTE
Brief Postoperative Note    Patient: Lakesha Boateng  YOB: 1945  MRN: [id number]    Date of Procedure: 3/27/2020     Pre-Op Diagnosis: Thyroid carcinoma  Post-Op Diagnosis: Same    Procedure(s):  TOTAL THYROIDECTOMY, CENTRAL NECK DISSECTION    Surgeon(s):  Josselin Isidro MD    Surgical Assistant: None    Anesthesia: General     Estimated Blood Loss (mL): 20ml    indications: Patient with a thyroid mass and FNA significant for carcinoma    Specimens:   ID Type Source Tests Collected by Time Destination   1 : TOTAL THYROID  Preservative Thyroid  Josselin Isidro MD 3/27/2020 0915 Pathology   2 : [place] NECK Preservative Neck  Josselin Isidro MD 3/27/2020 0919 Pathology        Implants:* No implants in log *    Drains:   Hi-Harris Drain 03/27/20 Mid Neck (Active)   Site Assessment Clean, dry, & intact 3/27/2020  9:41 AM   Dressing Status Clean, dry, & intact 3/27/2020  9:41 AM   Status Patent; Charged 3/27/2020  9:41 AM   Drainage Color Sanguinous 3/27/2020  9:41 AM       Findings: Positive nodes and central neck     Electronically Signed by Maryuri Doyle MD on 3/27/2020 at 10:15 AM

## 2020-03-27 NOTE — H&P
[facility]  HISTORY AND PHYSICAL    Name:  Ivanna Khan  MR#:   [id number]  :  1945  ACCOUNT #:  [de-identified]  ADMIT DATE:  2020    ADMISSION DIAGNOSIS:  Thyroid mass. HISTORY OF PRESENT ILLNESS:  The patient is a 68-year-old female who has had a longstanding history of significant thyroid nodules which had been relatively stable. She did have some increase size of the nodules for which she underwent subsequent fine-needle aspiration. The patient had a previous biopsy in , which was benign in nature. Repeat biopsy in 2019 revealed changes consistent with HRAS mutation, which revealed this to be 70% likely that this is a carcinoma. The patient is now here for evaluation of the above. The patient discussed in detail their options. The patient is wishing to proceed with a thyroidectomy with a potential central neck dissection. PAST MEDICAL HISTORY:  The patient has had a past medical history significant for  1. Peripheral vascular disease for which she has undergone stent placement in her legs, utilizing Plavix which was discontinued one week prior. 2.  She has history of cervical fusion by Dr. Cherri Knowles in the remote past x2.  3.  The patient now to undergo the above-mentioned thyroidectomy and possible central neck dissection. 4.  Significant for GERD. 5.  Esophagitis  6. Nephrolithiasis. 7.  Hypertension. 8.  Peripheral vascular disease. 9.  History of CVA in the past.    PAST SURGICAL HISTORY:  Includes a L5 laminectomy in  with a secondary laminectomy later that year. The patient has also had a history of an appendectomy, cholecystectomy, arterial bypass, ureteroscope with stone manipulation, stent placement. ALLERGIES:  INCLUDE PROMETHAZINE AND BACTRIM. CURRENT MEDICATIONS:  Medication use includes,  1. Amlodipine. 2.  Aspirin which was discontinued. 3.  Atorvastatin. 4.  Vitamin B12.  5.  Calcium. 6.  Carvedilol. 7.  Gabapentin.   8. Omeprazole. 9.  Plavix. 10.  Prolia. 11.  Venlafaxine. SOCIAL HISTORY:  Noncontributory. The patient denies any tobacco use, having quit in the remote past.    FAMILY HISTORY:  Relatively noncontributory. PHYSICAL EXAMINATION:  GENERAL:  Reveals a well-developed and well-nourished white female. VITAL SIGNS:  Height of 5 feet 2, weight 116. HEENT:  Ear exam reveals normal-appearing tympanic membranes. The oral cavity and oropharynx are within normal limits. The intranasal exam reveals no evidence of any mucopurulent discharge. NECK:  Reveals a well-healed scar. Thyroid exam is minimally enlarged. No evidence of any other significant findings are seen. CHEST:  Bilaterally clear. HEART:  S1 and S2. No murmur audible. EXTREMITIES:  Within normal limits. NEUROLOGIC:  Grossly intact. IMPRESSION:  Right thyroid mass. Fine-needle aspiration reveals this with a high likelihood of carcinoma. PLAN:  The patient to undergo thyroidectomy and central neck dissection if necessary. Risks, benefits, and complications discussed with the patient. who understands and aware. The patient to undergo the above procedure on 03/27/2020.       Hugo Hazel MD      [id number]  D:  03/27/2020 6:59  T:  03/27/2020 7:56  JOB #:  [id number]

## 2020-03-27 NOTE — ANESTHESIA PREPROCEDURE EVALUATION
Relevant Problems   No relevant active problems       Anesthetic History   No history of anesthetic complications            Review of Systems / Medical History  Patient summary reviewed, nursing notes reviewed and pertinent labs reviewed    Pulmonary                   Neuro/Psych       CVA: no residual symptoms  TIA and psychiatric history     Cardiovascular    Hypertension          CAD    Exercise tolerance: >4 METS  Comments: Stents in legs   GI/Hepatic/Renal     GERD: well controlled        Pertinent negatives: No liver disease and renal disease   Endo/Other        Arthritis  Pertinent negatives: No diabetes, hypothyroidism and hyperthyroidism   Other Findings   Comments: No obstructive symptoms from thyroid         Physical Exam    Airway  Mallampati: I  TM Distance: 4 - 6 cm  Neck ROM: normal range of motion   Mouth opening: Normal     Cardiovascular    Rhythm: regular  Rate: normal         Dental    Dentition: Full upper dentures, Lower partial plate and Edentulous     Pulmonary  Breath sounds clear to auscultation               Abdominal  GI exam deferred       Other Findings            Anesthetic Plan    ASA: 2  Anesthesia type: general          Induction: Intravenous  Anesthetic plan and risks discussed with: Patient

## 2020-03-27 NOTE — ANESTHESIA POSTPROCEDURE EVALUATION
Post-Anesthesia Evaluation and Assessment    Cardiovascular Function/Vital Signs  Visit Vitals  /69   Pulse 68   Temp 36.2 °C (97.2 °F)   Resp 11   Ht 5' 2\" (1.575 m)   Wt 51.9 kg (114 lb 7 oz)   SpO2 96%   BMI 20.93 kg/m²       Patient is status post Procedure(s):  TOTAL THYROIDECTOMY, CENTRAL NECK DISSECTION. Nausea/Vomiting: Controlled. Postoperative hydration reviewed and adequate. Pain:  Pain Scale 1: Numeric (0 - 10) (03/27/20 1034)  Pain Intensity 1: 0 (03/27/20 1034)   Managed. Neurological Status:   Neuro (WDL): Within Defined Limits (03/27/20 0273)   At baseline. Mental Status and Level of Consciousness: Arousable. Pulmonary Status:   O2 Device: Nasal cannula (03/27/20 1034)   Adequate oxygenation and airway patent. Complications related to anesthesia: None    Post-anesthesia assessment completed. No concerns. Patient has met all discharge requirements.     Signed By: Trey Romero CRNA    March 27, 2020

## 2020-03-27 NOTE — PROGRESS NOTES
TRANSFER - IN REPORT:    Verbal report received from AMANDA ParkerRN(name) on Yadira Buddle  being received from [place]) for routine progression of care      Report consisted of patients Situation, Background, Assessment and   Recommendations(SBAR). Information from the following report(s) SBAR, Kardex, OR Summary, Procedure Summary, Intake/Output and Recent Results was reviewed with the receiving nurse. Opportunity for questions and clarification was provided. Assessment completed upon patients arrival to unit and care assumed. 1100  Pt arrived via stretcher assisted on to ICU bed, attached to monitor, VSS taken, pt has incision with clear dressing and drain to low continuous wall suction. Pt states she feels good, no pain at this time, just tired. 1315  Pt co pain swallowing, asked for pain medicine. See MAR.  1600  Pt resting with eyes closed, neck soft, no swelling, clean dry intact. 1800  Emesis, hygiene provided. VSS  1911  Bedside, Verbal and Written shift change report given to Joshua Saavedra (oncoming nurse) by Nahun Bunch. Cristino Oliver (offgoing nurse). Report included the following information SBAR, Kardex, Intake/Output and Recent Results.

## 2020-03-28 ENCOUNTER — APPOINTMENT (OUTPATIENT)
Dept: GENERAL RADIOLOGY | Age: 75
DRG: 627 | End: 2020-03-28
Attending: INTERNAL MEDICINE
Payer: MEDICARE

## 2020-03-28 PROBLEM — I10 HYPERTENSION: Status: ACTIVE | Noted: 2020-03-28

## 2020-03-28 LAB
CALCIUM SERPL-MCNC: 8.3 MG/DL (ref 8.5–10.1)
CALCIUM SERPL-MCNC: 8.4 MG/DL (ref 8.5–10.1)
CK MB CFR SERPL CALC: NORMAL % (ref 0–4)
CK MB SERPL-MCNC: <1 NG/ML (ref 5–25)
CK SERPL-CCNC: 77 U/L (ref 26–192)
GLUCOSE BLD STRIP.AUTO-MCNC: 109 MG/DL (ref 70–110)
PTH-INTACT SERPL-MCNC: 21.8 PG/ML (ref 18.4–88)
TROPONIN I SERPL-MCNC: 0.02 NG/ML (ref 0–0.04)

## 2020-03-28 PROCEDURE — 74011250636 HC RX REV CODE- 250/636: Performed by: INTERNAL MEDICINE

## 2020-03-28 PROCEDURE — 74011250636 HC RX REV CODE- 250/636: Performed by: OTOLARYNGOLOGY

## 2020-03-28 PROCEDURE — 82550 ASSAY OF CK (CPK): CPT

## 2020-03-28 PROCEDURE — 83970 ASSAY OF PARATHORMONE: CPT

## 2020-03-28 PROCEDURE — 82962 GLUCOSE BLOOD TEST: CPT

## 2020-03-28 PROCEDURE — 92526 ORAL FUNCTION THERAPY: CPT

## 2020-03-28 PROCEDURE — 36415 COLL VENOUS BLD VENIPUNCTURE: CPT

## 2020-03-28 PROCEDURE — 82310 ASSAY OF CALCIUM: CPT

## 2020-03-28 PROCEDURE — 94640 AIRWAY INHALATION TREATMENT: CPT

## 2020-03-28 PROCEDURE — 77030027138 HC INCENT SPIROMETER -A

## 2020-03-28 PROCEDURE — 71045 X-RAY EXAM CHEST 1 VIEW: CPT

## 2020-03-28 PROCEDURE — 92610 EVALUATE SWALLOWING FUNCTION: CPT

## 2020-03-28 PROCEDURE — 96374 THER/PROPH/DIAG INJ IV PUSH: CPT

## 2020-03-28 PROCEDURE — 99218 HC RM OBSERVATION: CPT

## 2020-03-28 PROCEDURE — 96375 TX/PRO/DX INJ NEW DRUG ADDON: CPT

## 2020-03-28 PROCEDURE — 74011000250 HC RX REV CODE- 250: Performed by: INTERNAL MEDICINE

## 2020-03-28 PROCEDURE — 74011250637 HC RX REV CODE- 250/637: Performed by: OTOLARYNGOLOGY

## 2020-03-28 PROCEDURE — 96376 TX/PRO/DX INJ SAME DRUG ADON: CPT

## 2020-03-28 PROCEDURE — 77010033678 HC OXYGEN DAILY

## 2020-03-28 PROCEDURE — 74018 RADEX ABDOMEN 1 VIEW: CPT

## 2020-03-28 RX ORDER — CALCIUM CARBONATE 500(1250)
3 TABLET ORAL
Qty: 75 TAB | Refills: 0 | Status: SHIPPED | OUTPATIENT
Start: 2020-03-28

## 2020-03-28 RX ORDER — ONDANSETRON 2 MG/ML
4 INJECTION INTRAMUSCULAR; INTRAVENOUS
Status: DISCONTINUED | OUTPATIENT
Start: 2020-03-28 | End: 2020-03-29 | Stop reason: HOSPADM

## 2020-03-28 RX ORDER — LEVOTHYROXINE SODIUM 100 UG/1
100 TABLET ORAL DAILY
Qty: 90 TAB | Refills: 0 | Status: SHIPPED | OUTPATIENT
Start: 2020-03-29

## 2020-03-28 RX ORDER — METOPROLOL TARTRATE 5 MG/5ML
5 INJECTION INTRAVENOUS
Status: DISCONTINUED | OUTPATIENT
Start: 2020-03-28 | End: 2020-03-29 | Stop reason: HOSPADM

## 2020-03-28 RX ORDER — METOCLOPRAMIDE HYDROCHLORIDE 5 MG/ML
5 INJECTION INTRAMUSCULAR; INTRAVENOUS
Status: DISCONTINUED | OUTPATIENT
Start: 2020-03-28 | End: 2020-03-29 | Stop reason: HOSPADM

## 2020-03-28 RX ORDER — HYDROCODONE BITARTRATE AND ACETAMINOPHEN 5; 325 MG/1; MG/1
1 TABLET ORAL
Qty: 25 TAB | Refills: 0 | Status: SHIPPED | OUTPATIENT
Start: 2020-03-28 | End: 2020-04-04

## 2020-03-28 RX ADMIN — HYDRALAZINE HYDROCHLORIDE 10 MG: 20 INJECTION INTRAMUSCULAR; INTRAVENOUS at 13:47

## 2020-03-28 RX ADMIN — ONDANSETRON 4 MG: 2 INJECTION INTRAMUSCULAR; INTRAVENOUS at 18:06

## 2020-03-28 RX ADMIN — Medication 10 ML: at 21:12

## 2020-03-28 RX ADMIN — SODIUM CHLORIDE, SODIUM LACTATE, POTASSIUM CHLORIDE, AND CALCIUM CHLORIDE 75 ML/HR: 600; 310; 30; 20 INJECTION, SOLUTION INTRAVENOUS at 13:52

## 2020-03-28 RX ADMIN — HYDRALAZINE HYDROCHLORIDE 10 MG: 20 INJECTION INTRAMUSCULAR; INTRAVENOUS at 07:35

## 2020-03-28 RX ADMIN — IPRATROPIUM BROMIDE AND ALBUTEROL SULFATE 3 ML: .5; 3 SOLUTION RESPIRATORY (INHALATION) at 20:03

## 2020-03-28 RX ADMIN — HYDROMORPHONE HYDROCHLORIDE 0.2 MG: 1 INJECTION, SOLUTION INTRAMUSCULAR; INTRAVENOUS; SUBCUTANEOUS at 15:38

## 2020-03-28 RX ADMIN — GABAPENTIN 100 MG: 100 CAPSULE ORAL at 22:02

## 2020-03-28 RX ADMIN — Medication 10 ML: at 05:25

## 2020-03-28 RX ADMIN — CARVEDILOL 6.25 MG: 3.12 TABLET, FILM COATED ORAL at 17:57

## 2020-03-28 RX ADMIN — ONDANSETRON 4 MG: 2 INJECTION INTRAMUSCULAR; INTRAVENOUS at 13:59

## 2020-03-28 RX ADMIN — HYDROMORPHONE HYDROCHLORIDE 0.2 MG: 1 INJECTION, SOLUTION INTRAMUSCULAR; INTRAVENOUS; SUBCUTANEOUS at 10:13

## 2020-03-28 RX ADMIN — HYDROCODONE BITARTRATE AND ACETAMINOPHEN 1 TABLET: 5; 325 TABLET ORAL at 03:30

## 2020-03-28 RX ADMIN — IPRATROPIUM BROMIDE AND ALBUTEROL SULFATE 3 ML: .5; 3 SOLUTION RESPIRATORY (INHALATION) at 07:38

## 2020-03-28 RX ADMIN — METOCLOPRAMIDE 5 MG: 5 INJECTION, SOLUTION INTRAMUSCULAR; INTRAVENOUS at 20:46

## 2020-03-28 RX ADMIN — METOPROLOL TARTRATE 5 MG: 5 INJECTION INTRAVENOUS at 10:59

## 2020-03-28 RX ADMIN — ONDANSETRON 4 MG: 2 INJECTION INTRAMUSCULAR; INTRAVENOUS at 07:35

## 2020-03-28 RX ADMIN — AMLODIPINE BESYLATE 5 MG: 5 TABLET ORAL at 17:59

## 2020-03-28 RX ADMIN — ATORVASTATIN CALCIUM 40 MG: 20 TABLET, FILM COATED ORAL at 22:02

## 2020-03-28 RX ADMIN — HYDRALAZINE HYDROCHLORIDE 10 MG: 20 INJECTION INTRAMUSCULAR; INTRAVENOUS at 21:11

## 2020-03-28 RX ADMIN — Medication 10 ML: at 13:48

## 2020-03-28 RX ADMIN — METOPROLOL TARTRATE 5 MG: 5 INJECTION INTRAVENOUS at 17:37

## 2020-03-28 NOTE — CONSULTS
[facility]  Pulmonary, Critical Care, and Sleep Medicine    Name: Elizabeth Shah MRN: [id number]   : 1945 Hospital: [facility] [place]   Date: 3/28/2020        Pulmonary Critical Care Consult    IMPRESSION:   Patient Active Problem List   Diagnosis Code    Thyroid carcinoma ([facility] [address] C73    Hypoxemia R09.02    PVD (peripheral vascular disease) ([facility]) I73.9    Gastroesophageal reflux disease with esophagitis K21.0    Status post thyroid surgery Z98.890    Hypertension I10   Hypertensive urgency  Nausea vomiting    · Neuro: PRN pain medications, +/- sedation avoid oversedation monitor respiratory status   · CVS:  Post op give IVf ib hypotension. Monitor urine output HD stable; Monitor CVP, Actively titrate vasopressors aim MAP >65mmHg, Check cardiac panel, ECHO results. · Resp: Titrate FiO2/ supp O2 for SpO2 >90%; now on 2L of oxygen ex smoker add bronchodilators and incentive spirometry   · I/D: Afebrile; aleukocytosis; Sepsis bundle per hospital protocol, f/u BxCx/UC, Sputum Cx's. LA ordered- initial and repeat Q4hrs till normalized. ABX :prophylaxis  Deescalate ABX once Cx's finalize. · Hem/Onc: Daily CBC; H/H, and plts are stable  · Metabolic: monitor closely electrolytes especially calcium  Daily BMP; monitor e-lytes; replace PRN  · Renal:  Renal pabnel   · Endocrine: POC Glucose q6; Check TSH level  · GI: SUP, Trend LFTs, Zofran PRN for N/V   · Musc/Skin: No acute issues, wound care  · Fluids: NACL   · Code Status:full code    RECOMMENDATIONS:   Post thyroidectomy mild hypoxemia  CXr today   Aspiration precaution speech eval  Responding to 2 L of oxygen add incentive spirometry. Bronchodilators. Ex-smoker. Chest x-ray in a.m. Status post thyroidectomy. Tolerated procedure well. No significant bleeding after postop.     Continue bronchodilators, pulmonary hygiene care    No sepsis wound clean no bleeding   Antibiotic choice: ancef post op   Cultures drawn and will be followed. Lactic acid ordered- initial and repeat Q6hrs if elevated till normalized. Fluids:NACL avoid fluid oveload   Hypertensive urgency- add iv medication now iv metorpolr and iv hydralizine hard time taking po if not better start labetalol drip  ecg  Echo   Cardiac enzymes   Resume Deborrcooper Faria is ok with Dr. Georgette Cantu cardiac panel, ECHO  Diuresis prn   Monitor TSH    Glycemic control  Stress ulcer prophylaxis  DVT prophylaxis  AM labs  Diet: DIET CLEAR LIQUID after speech evaluation   Palliative care consult  Anand Bundle Followed    Will defer respective systems problem management to primary and other consultant and follow patient in ICU with primary and other medical team  Further recommendations will be based on the patient's response to recommended treatment and results of the investigation ordered. Quality Care: PPI, DVT prophylaxis, HOB elevated, Infection control all reviewed and addressed. PAIN AND SEDATION:   · Skin/Wound:  postv trach   · Nutrition:  speech eval  · Prophylaxis: DVT and GI Prophylaxis reviewed. · Restraints:  None   · PT/OT eval and treat:   · Lines/Tubes: lines  Peripheral iv     FAMILY DISCUSSION:  Full code   Events and notes from last 24 hours reviewed. Care plan discussed with nursing      Subjective/History:   Lakesha Boateng has been seen and evaluated as postop. Close monitoring. After thyroidectomy. 72-year-old female with past history of htn/GERD peripheral vascular disease, history of smoking in the past stopped 20 years ago. History of thyroid nodules with increasing in size. Suspected thyroid cancer. Today status post thyroidectomy. Mild hypoxemia postop. Next now awake and alert. Very minimal bleeding from the thyroidectomy site. Speech evaluation before takes p.o.      3/28/2020  Nausea and vomiting. No chest pain. We will check troponin EKG. Had a history of CVA in the past we will speak to Dr. Nikky Martin when she can receive aspirin.   Not able to take p.o. medications for hypertension I added IV she is IV metoprolol and IV hydralazine as needed. X-ray of the abdomen chest.  Aspiration precautions. Speech therapy. Wound post thyroidectomy looks great no bleeding swelling or discharge    Review of Systems:  A comprehensive review of systems was negative except for that written in the HPI. []The patient is unable to give any meaningful history or review of systems because the patient is:  []Intubated []Sedated   []Unresponsive []Unable to comprehend     []The patient is critically ill on      []Mechanical ventilation []Pressors   []BiPAP []               Latest lactic acid: No results found for: LAC    Completed IVF Resuscitation (30ml/kg) - yes  IVF choice: 0.9NS    Suspected source/s of severe sepsis: none     Organ dysfunction: No    Antibiotics:  Ancef post op     Completed physical exam: yes    Past Medical History:  Past Medical History:   Diagnosis Date    Arthritis     CAD (coronary artery disease)     stents in legs    GERD (gastroesophageal reflux disease)     Hypercholesteremia     Hypertension     age 38    Psychiatric disorder     anxiety and depression    Stroke ([facility] [address]     tia *4        Past Surgical History:  Past Surgical History:   Procedure Laterality Date    HX APPENDECTOMY      HX CHOLECYSTECTOMY      HX ORTHOPAEDIC      neck fabian    HX TONSILLECTOMY      HX TUBAL LIGATION      VASCULAR SURGERY PROCEDURE UNLIST      stents in legs        Medications:  Prior to Admission medications    Medication Sig Start Date End Date Taking? Authorizing Provider   [facility] 75 mg tablet Take  by mouth two (2) times a day. 7/1/14  Yes Provider, Historical   omeprazole (PRILOSEC) 40 mg capsule Take 40 mg by mouth daily. Yes Provider, Historical   Magnesium Oxide 500 mg cap Take  by mouth daily. Yes Provider, Historical   gabapentin (NEURONTIN) 100 mg capsule Take 100 mg by mouth three (3) times daily.  3/26/19  Yes Provider, Historical   docusate sodium (COLACE) 100 mg capsule Take 100 mg by mouth daily. 10/3/18  Yes Provider, Historical   cyanocobalamin 1,000 mcg tablet Take 1,000 mcg by mouth daily. Yes Provider, Historical   carvediloL (COREG) 6.25 mg tablet Take 6.25 mg by mouth two (2) times daily (with meals). 12/20/17  Yes Provider, Historical   calcium-vitamin D3-vitamin K 500 mg-1,000 unit-40 mcg chew Take 1 Tab by mouth daily. 7/1/14  Yes Provider, Historical   atorvastatin (LIPITOR) 40 mg tablet Take 40 mg by mouth nightly. Yes Provider, Historical   amLODIPine (NORVASC) 5 mg tablet Take 5 mg by mouth daily. 12/21/17  Yes Provider, Historical   clopidogreL (PLAVIX) 75 mg tab Indications: stents in leg 9/28/19   Provider, Historical   aspirin-calcium carbonate 81 mg-300 mg calcium(777 mg) tab Take 81 mg by mouth daily.  2/26/14   Provider, Historical       Current Facility-Administered Medications   Medication Dose Route Frequency    lactated Ringers infusion  75 mL/hr IntraVENous CONTINUOUS    carvediloL (COREG) tablet 6.25 mg  6.25 mg Oral BID WITH MEALS    docusate sodium (COLACE) capsule 100 mg  100 mg Oral DAILY    gabapentin (NEURONTIN) capsule 100 mg  100 mg Oral TID    magnesium oxide (MAG-OX) tablet 400 mg  400 mg Oral DAILY    pantoprazole (PROTONIX) tablet 40 mg  40 mg Oral ACB    venlafaxine (EFFEXOR) tablet 25 mg  25 mg Oral BID WITH MEALS    sodium chloride (NS) flush 5-40 mL  5-40 mL IntraVENous Q8H    levothyroxine (SYNTHROID) tablet 100 mcg  100 mcg Oral DAILY    amLODIPine (NORVASC) tablet 5 mg  5 mg Oral DAILY    atorvastatin (LIPITOR) tablet 40 mg  40 mg Oral QHS    albuterol-ipratropium (DUO-NEB) 2.5 MG-0.5 MG/3 ML  3 mL Nebulization BID RT    calcium carbonate (OS-FACUNDO) tablet 1,500 mg [elemental]  1,500 mg Oral TID WITH MEALS    calcium-vitamin D (OS-FACUNDO) 500 mg-200 unit tablet  1 Tab Oral DAILY       Allergy:  Allergies   Allergen Reactions    Sulfamethoxazole Other (comments)    Trimethoprim Other (comments)    Promethazine Other (comments)     Makes me crazy        Social History:  Social History     Tobacco Use    Smoking status: Former Smoker    Smokeless tobacco: Never Used   Substance Use Topics    Alcohol use: Never     Frequency: Never    Drug use: Never        Family History:  History reviewed. No pertinent family history. Objective:   Vital Signs:    Blood pressure 175/76, pulse (!) 112, temperature 98.1 °F (36.7 °C), resp. rate 21, height 5' 2\" (1.575 m), weight 51.9 kg (114 lb 7 oz), SpO2 100 %. Body mass index is 20.93 kg/m².    O2 Device: Room air   O2 Flow Rate (L/min): 2 l/min   Temp (24hrs), Av.7 °F (36.5 °C), Min:97.1 °F (36.2 °C), Max:98.9 °F (37.2 °C)       Patient Vitals for the past 8 hrs:   Temp Pulse Resp BP SpO2   20 0751 98.1 °F (36.7 °C) -- -- -- --   20 0744 -- -- -- -- 100 %   20 0740 -- -- -- -- 97 %   20 0735 -- (!) 112 -- 175/76 --   20 0700 -- 94 21 175/76 100 %   20 0600 -- 92 12 138/75 100 %   20 0500 -- 89 14 154/75 100 %   20 0400 -- (!) 106 17 150/83 100 %   20 0330 98.9 °F (37.2 °C) -- -- -- --   20 0300 -- 99 19 175/82 99 %   20 0200 -- 99 16 163/72 100 %     Intake/Output:   Last shift:       07 - 1900  In: -   Out: 75   Last 3 shifts: 1901 - 700  In:  [I.V.:]  Out: 2662 [Urine:1801; Drains:35]    Intake/Output Summary (Last 24 hours) at 3/28/2020 0931  Last data filed at 3/28/2020 0730  Gross per 24 hour   Intake 1010 ml   Output 2737 ml   Net -1727 ml       Physical Exam:  General: Alert and oriented to all spheres, well developed and well nourished, fatigued, cooperative, no distress, appears stated age  [de-identified]:     PERRLA, EOMI, fundi benign, ENT exam normal, no neck nodes or sinus tenderness  Neck: neck post thyroidectomy  Drains in place minimal bleeding   Chest: normal  Lungs: normal air entry, breathing normal , clear to auscultation bilaterally, no tenderness/ rash  Heart: Regular rate and rhythm  Abdomen: non distended, bowel sounds normoactive, tympanic, abdomen is soft without significant tenderness, masses, organomegaly or guarding, rigidity, rebound  Extremity: negative  Capillary refill: normal  Neuro: responds to voice, alert, oriented x 3, no defects noted in general exam.  Skin: Skin color, texture, turgor normal. No rashes or lesions or Skin color, texture, turgor normal    Data:     Recent Results (from the past 24 hour(s))   PTH INTACT    Collection Time: 03/27/20 12:20 PM   Result Value Ref Range    Calcium 8.4 (L) 8.5 - 10.1 MG/DL    PTH, Intact 12.0 (L) 18.4 - 88.0 pg/mL   CALCIUM    Collection Time: 03/27/20  3:48 PM   Result Value Ref Range    Calcium 8.3 (L) 8.5 - 10.1 MG/DL   RENAL FUNCTION PANEL    Collection Time: 03/27/20  6:30 PM   Result Value Ref Range    Sodium 142 136 - 145 mmol/L    Potassium 4.4 3.5 - 5.5 mmol/L    Chloride 110 100 - 111 mmol/L    CO2 27 21 - 32 mmol/L    Anion gap 5 3.0 - 18 mmol/L    Glucose 111 (H) 74 - 99 mg/dL    BUN 24 (H) 7.0 - 18 MG/DL    Creatinine 0.90 0.6 - 1.3 MG/DL    BUN/Creatinine ratio 27 (H) 12 - 20      GFR est AA >60 >60 ml/min/1.73m2    GFR est non-AA >60 >60 ml/min/1.73m2    Calcium 8.6 8.5 - 10.1 MG/DL    Phosphorus 4.3 2.5 - 4.9 MG/DL    Albumin 3.0 (L) 3.4 - 5.0 g/dL   CALCIUM, IONIZED    Collection Time: 03/27/20  6:30 PM   Result Value Ref Range    Ionized Calcium 1.18 1.12 - 1.32 MMOL/L   CALCIUM    Collection Time: 03/28/20  4:29 AM   Result Value Ref Range    Calcium 8.4 (L) 8.5 - 10.1 MG/DL           No results for input(s): FIO2I, IFO2, HCO3I, IHCO3, HCOPOC, PCO2I, PCOPOC, IPHI, PHI, PHPOC, PO2I, PO2POC in the last 72 hours.     No lab exists for component: IPOC2    All Micro Results     None          Telemetry: normal sinus rhythm     ECHO      Imaging:  [x]I have personally reviewed the patients chest radiographs images and report     No results found for this or any previous visit. No results found for this or any previous visit. [x]See my orders for details    My assessment, plan of care, findings, medications, side effects etc were discussed with:  [x]nursing []PT/OT    []respiratory therapy []Dr. Ever Cordova []Patient     [x]Total critical care time exclusive of procedures 45 minutes with complex decision making performed and > 50% time spent in face to face evaluation.     Alin Lama MD

## 2020-03-28 NOTE — OP NOTES
[facility]  OPERATIVE REPORT    Name:  Mirian Espinal  MR#:   [id number]  :  1945  ACCOUNT #:  [de-identified]  DATE OF SERVICE:  2020    PREOPERATIVE DIAGNOSIS:  Papillary thyroid carcinoma. POSTOPERATIVE DIAGNOSIS:  Papillary thyroid carcinoma. PROCEDURES PERFORMED:  1. Total thyroidectomy. 2.  Central neck dissection. SURGEON:  Rajwinder Chan MD    ASSISTANT:  None. ANESTHESIA:  General endotracheal anesthesia. COMPLICATIONS:  None. SPECIMENS REMOVED:  Sent to pathology. IMPLANTS:  None. ESTIMATED BLOOD LOSS:  25 mL. DRAINS:  One Hi-Harris drain. FINDINGS:  1.  Bilateral recurrent laryngeal nerves found, kept intact. 2.  Inferior parathyroid glands identified, kept intact. Superior parathyroids are not fully visualized, however, not explored as well. PROCEDURE:  The patient was brought to the operating room and placed supine on the operating room table. General endotracheal anesthesia was given per Anesthesiology Department. Once the patient was sufficiently anesthetized, the patient's neck was prepped and draped in the usual sterile fashion. Once this was completed, a standard thyroidectomy incision was made approximately 2.5 fingerbreadths above the sternal notch from the anterior border of the sternocleidomastoid on the right to the left side. The incision was carried down through the skin, subcutaneous tissue, and platysma. Once the platysma was visualized, skin and muscle flaps were then created superiorly and inferiorly in the subplatysmal plane which was subsequently sutured to the drapes. The strap muscles were opened in the midline, taken down to the level of the thyroid isthmus in the trachea. From thence, the right thyroid wall was mobilized. The strap muscles were elevated off the superior surface of the thyroid gland from medial to lateral direction on the right side.   This was done until the carotid sheath was identified and middle thyroid vein was isolated and ligated. This was ligated with double Ligaclips. It should be noted that during the course of the procedure, hemostasis was accomplished with the use of small and medium Ligaclips, 2-0 and 3-0 silk suture, Harmonic scalpel and bipolar cautery. Once the middle thyroid vein was ligated, the plane between the lateral edge of the thyroid gland and the carotid sheath was . The superior pole was  from the cricothyroid muscle and the superior pedicle was then triply ligated. This was ligated with 2-0 silk suture as well as large Ligaclips. After this was complete, the superior pole was rotated out of the wound. Attention now directed toward the inferior pole as well. Once the inferior pole was identified, there did appear to be a large node inferiorly as well as the inferior parathyroid gland. The parathyroid gland was  from the thyroid itself and retracted laterally. The inferior pole vessels were ligated as well and the entire thyroid lobe now was rotated medially. The recurrent laryngeal nerve was found in its usual position. The fascial attachments between the trachea and the thyroid gland were ligated and the recurrent laryngeal nerve traced into the cricothyroid membrane. Berry's ligament medial to this was ligated and the fascial attachments between the trachea and the thyroid isthmus was ligated as well. Attention now directed towards the contralateral lobe. Similar procedure was performed. Similar findings were noted. Findings included the complete preservation, identification of the recurrent laryngeal nerve as well as the inferior parathyroid gland. The superior parathyroid gland was thought to be identified, but was not certain. At this point, however, exploration was not performed in this setting. Once the thyroid gland was removed, the attention now directed toward the central neck.   The central neck contents were then explored. The carotid itself on the right and left side were  from the central neck. There was some large veins which were retracted and ligated as well. There was some significant adenopathy as well as some fat which was removed. After this was complete, the bleeding points were ligated with bipolar cautery. Valsalva maneuver was performed. The recurrent laryngeal nerves were completely kept intact without any evidence of any pathology. After this was complete, the Valsalva maneuver was performed. No evidence of any bleeding was seen. A 10-Turkish fluted drain was then placed via a separate stab wound. The drain was sutured in place. The wound was then closed in three layers closing the strap muscles, platysma, and skin with 3-0 Vicryl, 3-0 Vicryl, and 5-0 nylon respectively. The patient was subsequently taken from the operating room to the recovery room in stable condition after correct needle and sponge count were obtained.     Marley Reyes MD    [id number]  D:  03/28/2020 12:04  T:  03/28/2020 14:20  JOB #:  [id number]

## 2020-03-28 NOTE — PROGRESS NOTES
Medicine team is on consult list, but no consult placed verbally or in orders. Clarified with attending physician, Dr. Vásquez Speaker, if consult desired and he said that a medicine consult was not needed. Will be removing pt from medicine team census.

## 2020-03-28 NOTE — PROGRESS NOTES
Problem: Dysphagia (Adult)  Goal: *Acute Goals and Plan of Care (Insert Text)  Description: Dysphagia Present: mild pharyngeal  Aspiration: none on b/s eval      Recommendations:  Diet: advance to full liquid, advance as tolerated per surgery  Meds: as tolerated  Aspiration Precautions  Oral Care TID    Goals:  Patient will:  1. Tolerate PO trials with no overt s/s aspiration or distress in 4/5 trials  2. Perform oral-motor/laryngeal strengthening exercises with min cues to increase oropharyngeal swallow function   4. Complete an objective swallow study (i.e., MBSS) to assess swallow integrity, r/o aspiration, and determine of safest LRD, min A   Outcome: Progressing Towards Goal      SPEECH LANGUAGE PATHOLOGY BEDSIDE SWALLOW   EVALUATION & TREATMENT     Patient: Jackelyn Griffith (89 y.o. female)  Date: 3/28/2020  Primary Diagnosis: Thyroid carcinoma ([facility] [address] [C73]  Procedure(s) (LRB):  TOTAL THYROIDECTOMY, CENTRAL NECK DISSECTION (N/A) 1 Day Post-Op   Precautions: aspiration     PLOF: as per H&P    ASSESSMENT :  Based on the objective data described below, the patient presents with mild pharyngeal dysphagia s/p \"TOTAL THYROIDECTOMY, CENTRAL NECK DISSECTION\" 3/27/2020. Pt awake, A&O x4, clear speech, functional orolingual structures grossly [facility] for speech and deglutition. Pt denied h/o dysphagia, odynophagia, globus sensation, or s/sx aspiration with meals PTA. Pt currently on clear liquid diet with N/V. Pt accepted thin via independent small cup sips, ice chips, and puree via tsp, all self fed. Pt with functional oral phase with timely oral manipulation and transit, mildly delayed swallow response with weak and decreased cricoid elevation, no overt s/sx aspiration, SpO2 remained 97 throughout session. Pt did present with cough prior to vomiting, nursing notified. Pt also with c/o 6/10 headache, nurse admin meds.   Rec: advance diet to full liquid, meds admin as tolerated, aspiration precautions, small bites/ sips, decreased rate of intake, sit upright for all po feeds, advance to solids as tolerated per surgery. SLP will f/u with diet tolerance. TREATMENT :  Skilled therapy initiated; Educated pt on aspiration precautions and importance of compensatory swallow techniques to decrease aspiration risk (HOB upright for all po intake and ~30 minutes after po, small bites/ sips, decreased rate of intake); verbalized comprehension. SLP to follow as indicated. Patient will benefit from skilled intervention to address the above impairments. Patient's rehabilitation potential is considered to be Good  Factors which may influence rehabilitation potential include:   [x]            None noted  []            Mental ability/status  []            Medical condition  []            Home/family situation and support systems  []            Safety awareness  []            Pain tolerance/management  []            Other:      PLAN :  Recommendations and Planned Interventions:  advance diet to full liquid, meds admin as tolerated, aspiration precautions, small bites/ sips, decreased rate of intake, sit upright for all po feeds, advance to solids as tolerated per surgery. SLP will f/u with diet tolerance and possible diet advancement (per surgery post-op recs) if pt not back to regular solids. Frequency/Duration: Patient will be followed by speech-language pathology 1-2 times per day/3-5 days per week to address goals. Discharge Recommendations: Home Health, Outpatient, and To Be Determined     SUBJECTIVE:   Patient stated Uh-oh, I feel like going to throw up again.     OBJECTIVE:     Past Medical History:   Diagnosis Date    Arthritis     CAD (coronary artery disease)     stents in legs    GERD (gastroesophageal reflux disease)     Hypercholesteremia     Hypertension     age 38    Psychiatric disorder     anxiety and depression    Stroke ([facility] [address]     tia *4     Past Surgical History:   Procedure Laterality Date    HX APPENDECTOMY HX CHOLECYSTECTOMY      HX ORTHOPAEDIC      neck fabian    HX TONSILLECTOMY      HX TUBAL LIGATION      VASCULAR SURGERY PROCEDURE UNLIST      stents in legs     Home Situation:        Diet prior to admission: regular/ thin  Current Diet:  clear liquids     Cognitive and Communication Status:  Neurologic State: Alert  Orientation Level: Oriented X4  Cognition: Appropriate decision making, Appropriate for age attention/concentration, Appropriate safety awareness  Perception: Appears intact  Perseveration: No perseveration noted  Safety/Judgement: Fall prevention, Awareness of environment, Good awareness of safety precautions  Oral Assessment:  Oral Assessment  Labial: No impairment  Dentition: Intact  Oral Hygiene: wfl  Lingual: No impairment  Velum: No impairment  Mandible: No impairment  P.O. Trials:  Patient Position: [facility]*  Vocal quality prior to P.O.: No impairment  Consistency Presented: Thin liquid;Puree; Ice chips  How Presented: Self-fed/presented;Cup/sip;Spoon;Straw     Bolus Acceptance: No impairment  Bolus Formation/Control: No impairment     Propulsion: No impairment  Oral Residue: None  Initiation of Swallow: Delayed (# of seconds)  Laryngeal Elevation: Weak  Aspiration Signs/Symptoms: None  Pharyngeal Phase Characteristics: No impairment, issues, or problems   Effective Modifications: Small sips and bites  Cues for Modifications: None       Oral Phase Severity: No impairment  Pharyngeal Phase Severity : Mild    PAIN:  Pain level pre-treatment: 6/10   Pain level post-treatment: 6/10   Pain Intervention(s): Medication (see MAR);   Response to intervention: Nurse notified, See doc flow    After treatment:   []            Patient left in no apparent distress sitting up in chair  [x]            Patient left in no apparent distress in bed  [x]            Call bell left within reach  [x]            Nursing notified  []            Family present  []            Caregiver present  []            Bed alarm activated    COMMUNICATION/EDUCATION:   [x]            Aspiration precautions; swallow safety; compensatory techniques. [x]            Patient participated as able in goal setting and plan of care. []            Patient/family agree to work toward stated goals and plan of care. []            Patient understands intent and goals of therapy; neutral about participation. []            Patient unable to participate in goal setting/plan of care; educ ongoing with interdisciplinary staff  []         Posted safety precautions in patient's room.     Thank you for this referral.  Gera Dumont MS, CCC/SLP  Time Calculation: 26 mins  Evaluation Time: 15 minutes   Treatment Time: 11 minutes

## 2020-03-28 NOTE — PROGRESS NOTES
Postop day #1  Patient awake alert without post surgical complaint  Patient however with significant nausea and vomiting which has been a long-term issue for her in the past.  Patient's use of Antonino Summers has been helpful for her however presently utilizing Protonix  Physical exam-wound healing well. Drain in place. No hematoma noted. No erythema  Lungs clear  Abdomen soft  Extremity within normal limits    Calcium levels noted. Patient with a low PTH yesterday hence started on calcium 3 times daily     Impression  Thyroid mass-patient status post total thyroidectomy and central neck dissection. Doing very well however due to nausea and vomiting we will hold off on discharge for now. Will maintain drain in place however may be utilizing bulb suction rather than wall suction  If nausea and vomiting completely resolved and patient able to tolerate p.o. diet, may discharge later today however if persistent, will hold discharge till tomorrow. This discussed with patient and I have also made phone conversation to  at patient's request    Hypocalcemia-patient utilizing calcium supplements 3 times daily. Will have patient utilize this postoperatively Man Mata will aggressively wean     Anticoagulation-we will hold off on aspirin and Plavix for now.   If drain is removed we will start aspirin and Plavix 1 day thereafter    Yasemin Castillo

## 2020-03-28 NOTE — PROGRESS NOTES
0700- Bedside and Verbal shift change report given to [address] (oncoming nurse) by Jose Cortez RN (offgoing nurse). Report included the following information SBAR, Kardex, MAR and Recent Results. 1200- Per Dr. Homa Mckay, patient can discharge home if her nausea and vomiting subside, but also ok for patient to stay one more midnight as observation if she does not feel better. 1800-  Patient able to swallow PO BP medications (coreg and norvasc). PRN IV metoprolol also administered for HTN.      1900- Bedside and Verbal shift change report given to Mary Guzman RN (oncoming nurse) by Juan Manuel Alonzo RN (offgoing nurse). Report included the following information SBAR, Kardex, MAR and Recent Results.

## 2020-03-28 NOTE — PROGRESS NOTES
Speech Pathology Note      Swallow eval completed b/s with recs of full liquid diet, advance as tolerated when N/V cleared, basic aspiration precautions. Full report to follow. This SLP will be on-call tomorrow if any negative changes arise. Please write repeat order if that is to be the case.     Thank you for this referral,  Gerarda Kayser, MS, CCC/SLP  Speech- Language Pathologist

## 2020-03-28 NOTE — PROGRESS NOTES
@0629 pt taken over awake alert oriented x4 on 2L/nc. Denies pain at present sutured to neck and ASHANTI drain intact and remains on low continuous suction draining sanguenous liquid in small amounts. SCD's well tolerated. Voids via bedpan . Assessment done and charted in appropriate flow sheets. Nursing management continues. @8543 pt BP elevated Dr. Jaquelin Magallon was called updated hydralazine 10 mg administered as prescribed. Observation continues. @5661 pt complaint of pain to surgical site analgesia administered ,pt vomited Zofran administered hygiene needs and mouth care administered. Care continues. @ 8830 pt reassessed  Denies pain and nausea at this time no swelling to neck ASHANTI remains intact and on low suction care continues. @1389 pt vomited, mouth care administered , pt then verbalized that she is no longer nauseated , no swelling to neck drain remains in situ pt continues to be monitored. @9045 reassessment completed pt asleep easily aroused denies pain when awake. Vital signs SBP remains below 180 care continues . @5068 Bedside and Verbal shift change report given to Ghada Harrison (oncoming nurse) by Gladys Dill (offgoing nurse). Report included the following information SBAR, Kardex, Intake/Output, MAR, Recent Results, Med Rec Status and Alarm Parameters .

## 2020-03-29 ENCOUNTER — APPOINTMENT (OUTPATIENT)
Dept: NON INVASIVE DIAGNOSTICS | Age: 75
DRG: 627 | End: 2020-03-29
Attending: INTERNAL MEDICINE
Payer: MEDICARE

## 2020-03-29 VITALS
HEIGHT: 62 IN | WEIGHT: 114 LBS | SYSTOLIC BLOOD PRESSURE: 89 MMHG | BODY MASS INDEX: 20.98 KG/M2 | RESPIRATION RATE: 14 BRPM | TEMPERATURE: 98.6 F | OXYGEN SATURATION: 95 % | HEART RATE: 86 BPM | DIASTOLIC BLOOD PRESSURE: 68 MMHG

## 2020-03-29 LAB
ALBUMIN SERPL-MCNC: 2.6 G/DL (ref 3.4–5)
ANION GAP SERPL CALC-SCNC: 9 MMOL/L (ref 3–18)
AV VELOCITY RATIO: 0.84
AV VTI RATIO: 0.9
BUN SERPL-MCNC: 18 MG/DL (ref 7–18)
BUN/CREAT SERPL: 22 (ref 12–20)
CALCIUM SERPL-MCNC: 7.7 MG/DL (ref 8.5–10.1)
CHLORIDE SERPL-SCNC: 106 MMOL/L (ref 100–111)
CO2 SERPL-SCNC: 27 MMOL/L (ref 21–32)
CREAT SERPL-MCNC: 0.82 MG/DL (ref 0.6–1.3)
ECHO AV AREA PEAK VELOCITY: 2.3 CM2
ECHO AV AREA VTI: 2.5 CM2
ECHO AV AREA/BSA PEAK VELOCITY: 1.5 CM2/M2
ECHO AV AREA/BSA VTI: 1.7 CM2/M2
ECHO AV MEAN GRADIENT: 9.4 MMHG
ECHO AV MEAN VELOCITY: 1.46 M/S
ECHO AV PEAK GRADIENT: 16.1 MMHG
ECHO AV PEAK VELOCITY: 200.42 CM/S
ECHO AV VTI: 37.49 CM
ECHO IVC PROX: 1.4 CM
ECHO LA AREA 2C: 16.36 CM2
ECHO LA AREA 4C: 12.4 CM2
ECHO LA VOL 2C: 40.96 ML (ref 22–52)
ECHO LA VOL 4C: 28.31 ML (ref 22–52)
ECHO LA VOL BP: 43 ML (ref 22–52)
ECHO LA VOLUME INDEX A2C: 27.21 ML/M2 (ref 16–28)
ECHO LA VOLUME INDEX A4C: 18.81 ML/M2 (ref 16–28)
ECHO LV E' LATERAL VELOCITY: 9 CM/S
ECHO LV E' SEPTAL VELOCITY: 6 CM/S
ECHO LV EDV A4C: 0 ML
ECHO LV EDV INDEX A4C: 0 ML/M2
ECHO LV EDV TEICHHOLZ: 0.4 ML
ECHO LV EJECTION FRACTION A2C: 57 %
ECHO LV EJECTION FRACTION A4C: 65 %
ECHO LV EJECTION FRACTION BIPLANE: 61 % (ref 55–100)
ECHO LV ESV TEICHHOLZ: 0.18 ML
ECHO LV INTERNAL DIMENSION DIASTOLIC: 3.84 CM (ref 3.9–5.3)
ECHO LV INTERNAL DIMENSION SYSTOLIC: 2.73 CM
ECHO LV IVSD: 0.66 CM (ref 0.6–0.9)
ECHO LV MASS 2D: 71.2 G (ref 67–162)
ECHO LV MASS INDEX 2D: 47.3 G/M2 (ref 43–95)
ECHO LV POSTERIOR WALL DIASTOLIC: 0.67 CM (ref 0.6–0.9)
ECHO LVOT DIAM: 1.87 CM
ECHO LVOT PEAK GRADIENT: 11.3 MMHG
ECHO LVOT PEAK VELOCITY: 168.43 CM/S
ECHO LVOT VTI: 34.07 CM
ECHO MV A VELOCITY: 147.56 CM/S
ECHO MV AREA PHT: 2.3 CM2
ECHO MV E DECELERATION TIME (DT): 327.1 MS
ECHO MV E VELOCITY: 92.61 CM/S
ECHO MV E/A RATIO: 0.63
ECHO MV E/E' LATERAL: 10.29
ECHO MV E/E' RATIO (AVERAGED): 12.86
ECHO MV E/E' SEPTAL: 15.44
ECHO MV PRESSURE HALF TIME (PHT): 94.8 MS
ECHO RA AREA 4C: 11.41 CM2
ECHO RA VOLUME: 26.4 ML
ECHO TRICUSPID ANNULAR PEAK SYSTOLIC VELOCITY: 2 CM/S
ERYTHROCYTE [DISTWIDTH] IN BLOOD BY AUTOMATED COUNT: 14.9 % (ref 11.6–14.5)
GLUCOSE SERPL-MCNC: 80 MG/DL (ref 74–99)
HCT VFR BLD AUTO: 29.9 % (ref 35–45)
HGB BLD-MCNC: 9.4 G/DL (ref 12–16)
LVFS 2D: 28.95 %
LVOT MG: 7.31 MMHG
LVOT MV: 1.3 CM/S
LVSV (TEICH): 23.77 ML
MAGNESIUM SERPL-MCNC: 1.4 MG/DL (ref 1.6–2.6)
MCH RBC QN AUTO: 29.8 PG (ref 24–34)
MCHC RBC AUTO-ENTMCNC: 31.4 G/DL (ref 31–37)
MCV RBC AUTO: 94.9 FL (ref 74–97)
MV DEC SLOPE: 2.83
PHOSPHATE SERPL-MCNC: 4.2 MG/DL (ref 2.5–4.9)
PLATELET # BLD AUTO: 177 K/UL (ref 135–420)
PMV BLD AUTO: 9.8 FL (ref 9.2–11.8)
POTASSIUM SERPL-SCNC: 3.7 MMOL/L (ref 3.5–5.5)
RBC # BLD AUTO: 3.15 M/UL (ref 4.2–5.3)
SODIUM SERPL-SCNC: 142 MMOL/L (ref 136–145)
WBC # BLD AUTO: 8.3 K/UL (ref 4.6–13.2)

## 2020-03-29 PROCEDURE — 99218 HC RM OBSERVATION: CPT

## 2020-03-29 PROCEDURE — 74011000250 HC RX REV CODE- 250: Performed by: INTERNAL MEDICINE

## 2020-03-29 PROCEDURE — 74011250637 HC RX REV CODE- 250/637: Performed by: OTOLARYNGOLOGY

## 2020-03-29 PROCEDURE — 85027 COMPLETE CBC AUTOMATED: CPT

## 2020-03-29 PROCEDURE — 93306 TTE W/DOPPLER COMPLETE: CPT

## 2020-03-29 PROCEDURE — 74011250636 HC RX REV CODE- 250/636: Performed by: OTOLARYNGOLOGY

## 2020-03-29 PROCEDURE — 36415 COLL VENOUS BLD VENIPUNCTURE: CPT

## 2020-03-29 PROCEDURE — 94640 AIRWAY INHALATION TREATMENT: CPT

## 2020-03-29 PROCEDURE — 83735 ASSAY OF MAGNESIUM: CPT

## 2020-03-29 PROCEDURE — 65270000029 HC RM PRIVATE

## 2020-03-29 PROCEDURE — 80069 RENAL FUNCTION PANEL: CPT

## 2020-03-29 PROCEDURE — 96376 TX/PRO/DX INJ SAME DRUG ADON: CPT

## 2020-03-29 PROCEDURE — 74011250636 HC RX REV CODE- 250/636: Performed by: INTERNAL MEDICINE

## 2020-03-29 PROCEDURE — 74011000250 HC RX REV CODE- 250: Performed by: OTOLARYNGOLOGY

## 2020-03-29 RX ORDER — BACITRACIN ZINC 500 UNIT/G
OINTMENT (GRAM) TOPICAL 2 TIMES DAILY
Status: DISCONTINUED | OUTPATIENT
Start: 2020-03-29 | End: 2020-03-29 | Stop reason: HOSPADM

## 2020-03-29 RX ADMIN — ONDANSETRON 4 MG: 2 INJECTION INTRAMUSCULAR; INTRAVENOUS at 01:16

## 2020-03-29 RX ADMIN — Medication 400 MG: at 08:55

## 2020-03-29 RX ADMIN — VENLAFAXINE 25 MG: 50 TABLET ORAL at 08:55

## 2020-03-29 RX ADMIN — IPRATROPIUM BROMIDE AND ALBUTEROL SULFATE 3 ML: .5; 3 SOLUTION RESPIRATORY (INHALATION) at 07:28

## 2020-03-29 RX ADMIN — DOCUSATE SODIUM 100 MG: 100 CAPSULE, LIQUID FILLED ORAL at 08:54

## 2020-03-29 RX ADMIN — Medication 1 TABLET: at 08:54

## 2020-03-29 RX ADMIN — CARVEDILOL 6.25 MG: 3.12 TABLET, FILM COATED ORAL at 08:55

## 2020-03-29 RX ADMIN — Medication 1500 MG: at 08:54

## 2020-03-29 RX ADMIN — LEVOTHYROXINE SODIUM 100 MCG: 0.1 TABLET ORAL at 08:55

## 2020-03-29 RX ADMIN — METOCLOPRAMIDE 5 MG: 5 INJECTION, SOLUTION INTRAMUSCULAR; INTRAVENOUS at 03:50

## 2020-03-29 RX ADMIN — Medication: at 11:14

## 2020-03-29 RX ADMIN — ONDANSETRON 4 MG: 2 INJECTION INTRAMUSCULAR; INTRAVENOUS at 07:48

## 2020-03-29 RX ADMIN — GABAPENTIN 100 MG: 100 CAPSULE ORAL at 08:55

## 2020-03-29 RX ADMIN — AMLODIPINE BESYLATE 5 MG: 5 TABLET ORAL at 08:55

## 2020-03-29 RX ADMIN — PANTOPRAZOLE SODIUM 40 MG: 40 TABLET, DELAYED RELEASE ORAL at 07:35

## 2020-03-29 RX ADMIN — Medication 1500 MG: at 12:05

## 2020-03-29 NOTE — PROGRESS NOTES
0700 Bedside shift change report received from JACQUELINE Fleming RN.  Report included the following information SBAR, Kardex, MAR, Recent Results and Cardiac Rhythm SR.

## 2020-03-29 NOTE — CONSULTS
[facility]  Pulmonary, Critical Care, and Sleep Medicine    Name: Lester Perdomo MRN: [id number]   : 1945 Hospital: [facility] [place]   Date: 3/29/2020        Pulmonary Critical Care Consult    IMPRESSION:   Patient Active Problem List   Diagnosis Code    Thyroid carcinoma ([facility] C73    Hypoxemia R09.02    PVD [facility]peripheral vascular disease) ([facility] I73.9    Gastroesophageal reflux disease with esophagitis K21.0    Status post thyroid surgery Z98.890    Hypertension I10   Hypertensive urgency  Nausea vomiting    · Neuro: PRN pain medications, +/- sedation avoid oversedation monitor respiratory status   · CVS:  Post op give IVf ib hypotension. Monitor urine output HD stable; Monitor CVP, Actively titrate vasopressors aim MAP >65mmHg, Check cardiac panel, ECHO results. · Resp: Titrate FiO2/ supp O2 for SpO2 >90%; now on 2L of oxygen ex smoker add bronchodilators and incentive spirometry   · I/D: Afebrile; aleukocytosis; Sepsis bundle per hospital protocol, f/u BxCx/UC, Sputum Cx's. LA ordered- initial and repeat Q4hrs till normalized. ABX :prophylaxis  Deescalate ABX once Cx's finalize. · Hem/Onc: Daily CBC; H/H, and plts are stable  · Metabolic: monitor closely electrolytes especially calcium  Daily BMP; monitor e-lytes; replace PRN  · Renal:  Renal pabnel   · Endocrine: POC Glucose q6; Check TSH level  · GI: SUP, Trend LFTs, Zofran PRN for N/V   · Musc/Skin: No acute issues, wound care  · Fluids: NACL   · Code Status:full code    RECOMMENDATIONS:   Post thyroidectomy mild hypoxemia- resolved today now on Ra     CXr atelectasis incentive spirometry   Aspiration precaution speech eval  Responding to 2 L of oxygen add incentive spirometry. Bronchodilators. Ex-smoker. Chest x-ray in a.m. Status post thyroidectomy. Tolerated procedure well. No significant bleeding after postop.     Continue bronchodilators, pulmonary hygiene care    No sepsis wound clean no bleeding   Antibiotic choice: ancef post op   Cultures drawn and will be followed. Lactic acid ordered- initial and repeat Q6hrs if elevated till normalized. Fluids:NACL avoid fluid oveload   Hypertensive urgency- add iv medication now iv metorpolr and iv hydralizine hard time taking po if not better start labetalol drip  ecg  Echo   Cardiac enzymes   Resume Meng Sickle is ok with Dr. Marcelo Parham cardiac panel, ECHO  Diuresis prn   Monitor TSH    Glycemic control  Stress ulcer prophylaxis  DVT prophylaxis  AM labs  Diet: DIET DENTAL SOFT (SOFT SOLID) after speech evaluation   Palliative care consult  Anand Bundle Followed    Will defer respective systems problem management to primary and other consultant and follow patient in ICU with primary and other medical team  Further recommendations will be based on the patient's response to recommended treatment and results of the investigation ordered. Quality Care: PPI, DVT prophylaxis, HOB elevated, Infection control all reviewed and addressed. PAIN AND SEDATION:   · Skin/Wound:  postv trach   · Nutrition:  speech eval  · Prophylaxis: DVT and GI Prophylaxis reviewed. · Restraints:  None   · PT/OT eval and treat:   · Lines/Tubes: lines  Peripheral iv     FAMILY DISCUSSION:  Full code   Events and notes from last 24 hours reviewed. Care plan discussed with nursing      Subjective/History:   Tim Jaramillo has been seen and evaluated as postop. Close monitoring. After thyroidectomy. 82-year-old female with past history of htn/GERD peripheral vascular disease, history of smoking in the past stopped 20 years ago. History of thyroid nodules with increasing in size. Suspected thyroid cancer. Today status post thyroidectomy. Mild hypoxemia postop. Next now awake and alert. Very minimal bleeding from the thyroidectomy site.   Speech evaluation before takes p.o.      3/29/2020  N/V resolved   Patient able to take po medication including calcium and BP meds  Awake alert not in distress  pateitn will resume Asa on Monday and plavix on Tuesday  Would is clear  beofre she goes home we will check cbc   Discharge later today per Dr. Nette Teran            3/28/2020  Nausea and vomiting. No chest pain. We will check troponin EKG. Had a history of CVA in the past we will speak to Dr. Nette Teran when she can receive aspirin. Not able to take p.o. medications for hypertension I added IV she is IV metoprolol and IV hydralazine as needed. X-ray of the abdomen chest.  Aspiration precautions. Speech therapy. Wound post thyroidectomy looks great no bleeding swelling or discharge    Review of Systems:  A comprehensive review of systems was negative except for that written in the HPI. []The patient is unable to give any meaningful history or review of systems because the patient is:  []Intubated []Sedated   []Unresponsive []Unable to comprehend     []The patient is critically ill on      []Mechanical ventilation []Pressors   []BiPAP []               Latest lactic acid: No results found for: LAC    Completed IVF Resuscitation (30ml/kg) - yes  IVF choice: 0.9NS    Suspected source/s of severe sepsis: none     Organ dysfunction: No    Antibiotics:  Ancef post op     Completed physical exam: yes    Past Medical History:  Past Medical History:   Diagnosis Date    Arthritis     CAD (coronary artery disease)     stents in legs    GERD (gastroesophageal reflux disease)     Hypercholesteremia     Hypertension     age 38    Psychiatric disorder     anxiety and depression    Stroke ([facility]     tia *4        Past Surgical History:  Past Surgical History:   Procedure Laterality Date    HX APPENDECTOMY      HX CHOLECYSTECTOMY      HX ORTHOPAEDIC      neck fabian    HX TONSILLECTOMY      HX TUBAL LIGATION      VASCULAR SURGERY PROCEDURE UNLIST      stents in legs        Medications:  Prior to Admission medications    Medication Sig Start Date End Date Taking?  Authorizing Provider   calcium carbonate (OS-FACUNDO) 500 mg calcium (1,250 mg) tablet Take 3 Tabs by mouth three (3) times daily (with meals). 3/28/20  Yes Mike Perez MD   HYDROcodone-acetaminophen [facility]) 5-325 mg per tablet Take 1 Tab by mouth every four (4) hours as needed for Pain for up to 7 days. Max Daily Amount: 6 Tabs. 3/28/20 4/4/20 Yes Mike Perez MD   levothyroxine (SYNTHROID) 100 mcg tablet Take 1 Tab by mouth daily. 3/29/20  Yes Mike Perez MD   omeprazole (PRILOSEC) 40 mg capsule Take 40 mg by mouth daily. Yes Provider, Historical   Magnesium Oxide 500 mg cap Take  by mouth daily. Yes Provider, Historical   gabapentin (NEURONTIN) 100 mg capsule Take 100 mg by mouth three (3) times daily. 3/26/19  Yes Provider, Historical   docusate sodium (COLACE) 100 mg capsule Take 100 mg by mouth daily. 10/3/18  Yes Provider, Historical   cyanocobalamin 1,000 mcg tablet Take 1,000 mcg by mouth daily. Yes Provider, Historical   carvediloL (COREG) 6.25 mg tablet Take 6.25 mg by mouth two (2) times daily (with meals). 12/20/17  Yes Provider, Historical   calcium-vitamin D3-vitamin K 500 mg-1,000 unit-40 mcg chew Take 1 Tab by mouth daily. 7/1/14  Yes Provider, Historical   atorvastatin (LIPITOR) 40 mg tablet Take 40 mg by mouth nightly. Yes Provider, Historical   amLODIPine (NORVASC) 5 mg tablet Take 5 mg by mouth daily. 12/21/17  Yes Provider, Historical   clopidogreL (PLAVIX) 75 mg tab Indications: stents in leg 9/28/19   Provider, Historical   aspirin-calcium carbonate 81 mg-300 mg calcium(777 mg) tab Take 81 mg by mouth daily.  2/26/14   Provider, Historical       Current Facility-Administered Medications   Medication Dose Route Frequency    bacitracin zinc (BACITRACIN) 500 unit/gram ointment   Topical BID    lactated Ringers infusion  75 mL/hr IntraVENous CONTINUOUS    carvediloL (COREG) tablet 6.25 mg  6.25 mg Oral BID WITH MEALS    docusate sodium (COLACE) capsule 100 mg  100 mg Oral DAILY    gabapentin (NEURONTIN) capsule 100 mg 100 mg Oral TID    magnesium oxide (MAG-OX) tablet 400 mg  400 mg Oral DAILY    pantoprazole (PROTONIX) tablet 40 mg  40 mg Oral ACB    venlafaxine (EFFEXOR) tablet 25 mg  25 mg Oral BID WITH MEALS    sodium chloride (NS) flush 5-40 mL  5-40 mL IntraVENous Q8H    levothyroxine (SYNTHROID) tablet 100 mcg  100 mcg Oral DAILY    amLODIPine (NORVASC) tablet 5 mg  5 mg Oral DAILY    atorvastatin (LIPITOR) tablet 40 mg  40 mg Oral QHS    albuterol-ipratropium (DUO-NEB) 2.5 MG-0.5 MG/3 ML  3 mL Nebulization BID RT    calcium carbonate (OS-FACUNDO) tablet 1,500 mg [elemental]  1,500 mg Oral TID WITH MEALS    calcium-vitamin D (OS-FACUNDO) 500 mg-200 unit tablet  1 Tab Oral DAILY       Allergy:  Allergies   Allergen Reactions    Sulfamethoxazole Other (comments)    Trimethoprim Other (comments)    Promethazine Other (comments)     Makes me crazy        Social History:  Social History     Tobacco Use    Smoking status: Former Smoker    Smokeless tobacco: Never Used   Substance Use Topics    Alcohol use: Never     Frequency: Never    Drug use: Never        Family History:  History reviewed. No pertinent family history. Objective:   Vital Signs:    Blood pressure 113/84, pulse 94, temperature 98.4 °F (36.9 °C), resp. rate 16, height 5' 2\" (1.575 m), weight 51.7 kg (114 lb), SpO2 100 %. Body mass index is 20.85 kg/m². O2 Device: Room air   O2 Flow Rate (L/min): 2 l/min   Temp (24hrs), Av °F (37.2 °C), Min:98.4 °F (36.9 °C), Max:99.8 °F (37.7 °C)       Patient Vitals for the past 8 hrs:   Temp Pulse Resp BP SpO2   20 0817 -- -- -- 113/84 --   20 0728 -- -- -- -- 100 %   20 0600 -- 94 16 113/84 95 %   20 0500 -- 88 14 122/70 95 %   20 0400 98.4 °F (36.9 °C) 92 16 98/73 98 %   20 0300 -- 88 19 94/69 95 %   20 0200 -- 82 21 117/66 97 %     Intake/Output:   Last shift:      No intake/output data recorded.   Last 3 shifts:  1901 -  0700  In: 900 [I.V.:900]  Out: 46 [Urine:2100; Drains:25]    Intake/Output Summary (Last 24 hours) at 3/29/2020 5585  Last data filed at 3/28/2020 1900  Gross per 24 hour   Intake 900 ml   Output 705 ml   Net 195 ml       Physical Exam:  General: Alert and oriented to all spheres, well developed and well nourished, fatigued, cooperative, no distress, appears stated age  [de-identified]:     PERRLA, EOMI, fundi benign, ENT exam normal, no neck nodes or sinus tenderness  Neck: neck post thyroidectomy  Drains in place minimal bleeding   Chest: normal  Lungs: normal air entry, breathing normal , clear to auscultation bilaterally, no tenderness/ rash  Heart: Regular rate and rhythm  Abdomen: non distended, bowel sounds normoactive, tympanic, abdomen is soft without significant tenderness, masses, organomegaly or guarding, rigidity, rebound  Extremity: negative  Capillary refill: normal  Neuro: responds to voice, alert, oriented x 3, no defects noted in general exam.  Skin: Skin color, texture, turgor normal. No rashes or lesions or Skin color, texture, turgor normal    Data:     Recent Results (from the past 24 hour(s))   CARDIAC PANEL,(CK, CKMB & TROPONIN)    Collection Time: 03/28/20 10:30 AM   Result Value Ref Range    CK 77 26 - 192 U/L    CK - MB <1.0 <3.6 ng/ml    CK-MB Index  0.0 - 4.0 %     CALCULATION NOT PERFORMED WHEN RESULT IS BELOW LINEAR LIMIT    Troponin-I, QT 0.02 0.0 - 0.045 NG/ML   GLUCOSE, POC    Collection Time: 03/28/20  9:03 PM   Result Value Ref Range    Glucose (POC) 109 70 - 110 mg/dL   RENAL FUNCTION PANEL    Collection Time: 03/29/20  4:11 AM   Result Value Ref Range    Sodium 142 136 - 145 mmol/L    Potassium 3.7 3.5 - 5.5 mmol/L    Chloride 106 100 - 111 mmol/L    CO2 27 21 - 32 mmol/L    Anion gap 9 3.0 - 18 mmol/L    Glucose 80 74 - 99 mg/dL    BUN 18 7.0 - 18 MG/DL    Creatinine 0.82 0.6 - 1.3 MG/DL    BUN/Creatinine ratio 22 (H) 12 - 20      GFR est AA >60 >60 ml/min/1.73m2    GFR est non-AA >60 >60 ml/min/1.73m2    Calcium 7.7 (L) 8.5 - 10.1 MG/DL    Phosphorus 4.2 2.5 - 4.9 MG/DL    Albumin 2.6 (L) 3.4 - 5.0 g/dL   MAGNESIUM    Collection Time: 03/29/20  4:11 AM   Result Value Ref Range    Magnesium 1.4 (L) 1.6 - 2.6 mg/dL   ECHO ADULT COMPLETE    Collection Time: 03/29/20  8:18 AM   Result Value Ref Range    LA Volume 43.00 22 - 52 ml    Right Atrial Area 4C 11.41 cm2           No results for input(s): FIO2I, IFO2, HCO3I, IHCO3, HCOPOC, PCO2I, PCOPOC, IPHI, PHI, PHPOC, PO2I, PO2POC in the last 72 hours. No lab exists for component: IPOC2    All Micro Results     None          Telemetry: normal sinus rhythm     ECHO      Imaging:  [x]I have personally reviewed the patients chest radiographs images and report     Results from Hospital Encounter encounter on 03/27/20   XR CHEST PORT    Narrative EXAM: One view chest x-ray    CLINICAL INDICATION/HISTORY: Dyspnea/hypoxia. COMPARISON: 03/26/2006. TECHNIQUE: Single AP view of the chest was obtained.    _______________    FINDINGS:    HEART, VESSELS, MEDIASTINUM: Heart size is normal. No vascular congestion. LUNGS, PLEURAL SPACES: Linear parenchymal opacity in the right upper lung. Left  lung is clear. No effusion or pneumothorax. BONY THORAX, SOFT TISSUES: Postsurgical changes of the cervical spine and upper  abdomen noted. _______________      Impression IMPRESSION:    Subsegmental atelectasis of the right upper lung. No results found for this or any previous visit. [x]See my orders for details    My assessment, plan of care, findings, medications, side effects etc were discussed with:  [x]nursing []PT/OT    []respiratory therapy []Dr. Melecio Garcia []Patient     [x]Total critical care time exclusive of procedures 45 minutes with complex decision making performed and > 50% time spent in face to face evaluation.     Ed Ortiz MD

## 2020-03-29 NOTE — PROGRESS NOTES
1930- Report and care received from DANICA Card RN. Care assumed, assessment completed per flow sheet. 2030- Patient actively vomiting. Cold pack to posterior neck and see MAR.     2100- Nausea decreased, vomiting discontinued. Will continue to monitor. 0000- Reassessment completed per flow sheet. Resting quietly with eyes closed. Will continue to monitor. 0400- Reassessment completed per flow sheet. Resting quietly with eyes closed. Will continue to monitor. [facility] and report given to CONSUELO Hobbs RN.

## 2020-03-29 NOTE — PROGRESS NOTES
Postop day #2  Vital signs stable. Patient with some elevation of blood pressure intermittently over the past day however now appears to be under control. Patient has had a echo performed within the hour with the results pending  Wound clean and drain removed  Calcium level 7.7 and albumin 2.4 but adjusted calcium above 8.5  PTH level yesterday within normal limits    Impression/Plan  1. Thyroid mass, patient status post total thyroidectomy central neck dissection. Doing well. If patient's echocardiogram unremarkable and patient stable from intensivist standpoint, would discharge to home  2. Hypocalcemia-adjusted calcium 8.5. Uncertain of how much p.o. calcium was absorbed secondary to patient's previous and nausea and vomiting  Will send home on supplemental calcium 3 times daily with recheck next week  3. Nausea-completely resolved. Patient hungry.   Will advance diet to soft diet  Have discussed with Dr. Gatito Nathan

## 2020-03-29 NOTE — PROGRESS NOTES
Letter of Status Determination:   Recommend hospitalization status upgraded from   OBSERVATION  to INPATIENT  Status     Pt Name:  Lakesha Boateng   MR#   [address] [facility] # [id number] /  [id number]  Payor: Liliana Aggarwal / Plan: [address] / Product Type: Medicare /    FLORECITA#  [id number]   [address] [place] [address]  102/01  @ Kristy Fee [facility]   Hospitalization date  3/27/2020  5:39 AM   Current Attending Physician  Josselin Isidro MD   Principal diagnosis  Thyroid carcinoma [facility]) [facility]     Clinicals  69 y.o. y.o  female hospitalized with above diagnosis s/p thyroidectomy        [place] ([facility]) criteria   Does  NOT apply    STATUS DETERMINATION  This patient is at above high risk of deterioration based on documented presenting clinical data, comorbid conditions, high risk of adverse events and current acute care course. Ms. Lakesha Boateng now meets Inpatient Admission status criteria in accordance with CMS regulation Section 43 .3. Specifically, due to medical necessity the patient's stay now exceeds Two Midnights. It is our recommendation that this patient's hospitalization status should be upgraded from  OBSERVATION to INPATIENT status.      The final decision of the patient's hospitalization status depends on the attending physician's judgment           Additional comments     Payor: Liliana Aggarwal / Plan: [place] MEDICARE PART A & B / Product Type: Medicare /       Anne Marie Flores   Physician Advisor   [employer]                [phone]    .    3/29/2020 9:46 AM [phone] OBSERVATION 102/[address] Payor: Liliana Aggarwal / Plan: [address] / Product Type: Medicare /  Lety Bravo

## 2020-04-13 NOTE — DISCHARGE SUMMARY
[address]    Name:  Pan Javier  MR#:   [id number]  :  1945  ACCOUNT #:  [de-identified]  ADMIT DATE:  2020  DISCHARGE DATE:  2020    ADMISSION DIAGNOSIS:  Thyroid mass. DISCHARGE DIAGNOSIS:  Thyroid mass. DISCHARGE ACTIVITIES:  Light activity. MEDICATIONS:  The patient is to resume all preoperative meds. She was also placed on calcium 1 g p.o. t.i.d.    DISCHARGE DIET:  As tolerated. SPECIAL INSTRUCTIONS:  The patient is to follow up with me in one week's time. HOSPITAL COURSE:  The patient underwent a total thyroidectomy as well as central neck dissection for a presumed thyroid carcinoma noted on frozen section determination. Postoperatively, the patient had some difficulty with significant nausea and emesis. She was kept an extra day. She was originally scheduled to be discharged on postoperative day 1; however, due to the nausea and vomiting which was completely controlled at the time of discharge, the patient was kept an additional day. During this time, the patient was also noted to have some difficulties with some intermittent blood pressure issues. She underwent an echocardiogram which was noted to be within normal limits. The patient was subsequently discharged to home on postoperative day 2. Drain was discontinued at that time. The patient will follow up with me in one week's time and will also follow with Dr. Racquel Balderrama in one week as well. The patient was discharged with all of her meds intact and was given additional calcium carbonate 1.5 g t.i.d. We will wean this off over the next month or so. Above was discussed with the patient who understands and is aware.       Agnieszka Osborn MD      [id number]  D:  2020 8:23  T:  2020 10:10  JOB #:  [id number]  CC:  Yovanny Calvert MD

## 2020-08-20 ENCOUNTER — HOSPITAL ENCOUNTER (OUTPATIENT)
Dept: LAB | Age: 75
Discharge: HOME | End: 2020-08-20

## 2020-08-20 ENCOUNTER — HOSPITAL ENCOUNTER (OUTPATIENT)
Dept: LAB | Age: 75
Discharge: HOME | End: 2020-08-20
Payer: MEDICARE

## 2020-08-20 DIAGNOSIS — E83.51 HYPOCALCEMIA: ICD-10-CM

## 2020-08-20 DIAGNOSIS — E04.1 NONTOXIC UNINODULAR GOITER: ICD-10-CM

## 2020-08-20 LAB
25(OH)D3 SERPL-MCNC: 58 NG/ML (ref 30–100)
CALCIUM SERPL-MCNC: 9 MG/DL (ref 8.5–10.1)
T4 FREE SERPL-MCNC: 0.7 NG/DL (ref 0.7–1.5)
TSH SERPL DL<=0.05 MIU/L-ACNC: 17.8 UIU/ML (ref 0.36–3.74)

## 2020-08-20 PROCEDURE — 82306 VITAMIN D 25 HYDROXY: CPT

## 2020-08-20 PROCEDURE — 84443 ASSAY THYROID STIM HORMONE: CPT

## 2020-08-20 PROCEDURE — 36415 COLL VENOUS BLD VENIPUNCTURE: CPT

## 2020-08-20 PROCEDURE — 84439 ASSAY OF FREE THYROXINE: CPT

## 2020-08-20 PROCEDURE — 82310 ASSAY OF CALCIUM: CPT

## 2020-08-24 LAB
FAX TO INFO,FAXT: NORMAL
FAX TO NUMBER,FAXN: NORMAL

## 2021-04-07 ENCOUNTER — HOSPITAL ENCOUNTER (OUTPATIENT)
Dept: PREADMISSION TESTING | Age: 76
Discharge: HOME | End: 2021-04-07
Payer: MEDICARE

## 2021-04-07 ENCOUNTER — TRANSCRIBE ORDER (OUTPATIENT)
Dept: REGISTRATION | Age: 76
End: 2021-04-07

## 2021-04-07 DIAGNOSIS — N95.0 POSTMENOPAUSAL BLEEDING: Primary | ICD-10-CM

## 2021-04-07 DIAGNOSIS — N95.0 POSTMENOPAUSAL BLEEDING: ICD-10-CM

## 2021-04-07 LAB
ATRIAL RATE: 69 BPM
BASOPHILS # BLD: 0 K/UL (ref 0–0.1)
BASOPHILS NFR BLD: 0 % (ref 0–2)
CALCULATED P AXIS, ECG09: 78 DEGREES
CALCULATED R AXIS, ECG10: 85 DEGREES
CALCULATED T AXIS, ECG11: 110 DEGREES
DIAGNOSIS, 93000: NORMAL
DIFFERENTIAL METHOD BLD: ABNORMAL
EOSINOPHIL # BLD: 0.2 K/UL (ref 0–0.4)
EOSINOPHIL NFR BLD: 3 % (ref 0–5)
ERYTHROCYTE [DISTWIDTH] IN BLOOD BY AUTOMATED COUNT: 17.6 % (ref 11.6–14.5)
HCT VFR BLD AUTO: 26.6 % (ref 35–45)
HGB BLD-MCNC: 8 G/DL (ref 12–16)
LYMPHOCYTES # BLD: 0.8 K/UL (ref 0.9–3.6)
LYMPHOCYTES NFR BLD: 13 % (ref 21–52)
MCH RBC QN AUTO: 30.1 PG (ref 24–34)
MCHC RBC AUTO-ENTMCNC: 30.1 G/DL (ref 31–37)
MCV RBC AUTO: 100 FL (ref 74–97)
MONOCYTES # BLD: 0.4 K/UL (ref 0.05–1.2)
MONOCYTES NFR BLD: 6 % (ref 3–10)
NEUTS SEG # BLD: 4.6 K/UL (ref 1.8–8)
NEUTS SEG NFR BLD: 78 % (ref 40–73)
P-R INTERVAL, ECG05: 140 MS
PLATELET # BLD AUTO: 175 K/UL (ref 135–420)
PMV BLD AUTO: 9.2 FL (ref 9.2–11.8)
Q-T INTERVAL, ECG07: 404 MS
QRS DURATION, ECG06: 82 MS
QTC CALCULATION (BEZET), ECG08: 432 MS
RBC # BLD AUTO: 2.66 M/UL (ref 4.2–5.3)
VENTRICULAR RATE, ECG03: 69 BPM
WBC # BLD AUTO: 5.9 K/UL (ref 4.6–13.2)

## 2021-04-07 PROCEDURE — 85025 COMPLETE CBC W/AUTO DIFF WBC: CPT

## 2021-04-07 PROCEDURE — 93005 ELECTROCARDIOGRAM TRACING: CPT

## 2021-04-07 PROCEDURE — 36415 COLL VENOUS BLD VENIPUNCTURE: CPT

## 2021-04-14 ENCOUNTER — HOSPITAL ENCOUNTER (OUTPATIENT)
Dept: PREADMISSION TESTING | Age: 76
Discharge: HOME | End: 2021-04-14
Payer: MEDICARE

## 2021-04-14 PROCEDURE — U0003 INFECTIOUS AGENT DETECTION BY NUCLEIC ACID (DNA OR RNA); SEVERE ACUTE RESPIRATORY SYNDROME CORONAVIRUS 2 (SARS-COV-2) (CORONAVIRUS DISEASE [COVID-19]), AMPLIFIED PROBE TECHNIQUE, MAKING USE OF HIGH THROUGHPUT TECHNOLOGIES AS DESCRIBED BY CMS-2020-01-R: HCPCS

## 2021-04-15 LAB — SARS-COV-2, COV2NT: NOT DETECTED

## 2021-04-17 ENCOUNTER — HOSPITAL ENCOUNTER (EMERGENCY)
Age: 76
Discharge: HOME | End: 2021-04-17
Attending: EMERGENCY MEDICINE
Payer: MEDICARE

## 2021-04-17 ENCOUNTER — APPOINTMENT (OUTPATIENT)
Dept: VASCULAR SURGERY | Age: 76
End: 2021-04-17
Attending: EMERGENCY MEDICINE
Payer: MEDICARE

## 2021-04-17 ENCOUNTER — APPOINTMENT (OUTPATIENT)
Dept: GENERAL RADIOLOGY | Age: 76
End: 2021-04-17
Attending: EMERGENCY MEDICINE
Payer: MEDICARE

## 2021-04-17 VITALS
BODY MASS INDEX: 20.24 KG/M2 | HEIGHT: 62 IN | RESPIRATION RATE: 16 BRPM | DIASTOLIC BLOOD PRESSURE: 100 MMHG | WEIGHT: 110 LBS | TEMPERATURE: 97.1 F | HEART RATE: 83 BPM | SYSTOLIC BLOOD PRESSURE: 183 MMHG | OXYGEN SATURATION: 99 %

## 2021-04-17 DIAGNOSIS — L03.116 CELLULITIS OF LEFT LOWER EXTREMITY: Primary | ICD-10-CM

## 2021-04-17 DIAGNOSIS — I73.9 PVD (PERIPHERAL VASCULAR DISEASE) (HCC): ICD-10-CM

## 2021-04-17 DIAGNOSIS — B02.9 HERPES ZOSTER WITHOUT COMPLICATION: ICD-10-CM

## 2021-04-17 LAB
ANION GAP SERPL CALC-SCNC: 7 MMOL/L (ref 3–18)
APTT PPP: 40.7 SEC (ref 23–36.4)
BASOPHILS # BLD: 0 K/UL (ref 0–0.1)
BASOPHILS NFR BLD: 0 % (ref 0–2)
BUN SERPL-MCNC: 28 MG/DL (ref 7–18)
BUN/CREAT SERPL: 20 (ref 12–20)
CALCIUM SERPL-MCNC: 6.8 MG/DL (ref 8.5–10.1)
CHLORIDE SERPL-SCNC: 111 MMOL/L (ref 100–111)
CK SERPL-CCNC: 130 U/L (ref 26–192)
CO2 SERPL-SCNC: 25 MMOL/L (ref 21–32)
CREAT SERPL-MCNC: 1.42 MG/DL (ref 0.6–1.3)
DIFFERENTIAL METHOD BLD: ABNORMAL
EOSINOPHIL # BLD: 0 K/UL (ref 0–0.4)
EOSINOPHIL NFR BLD: 1 % (ref 0–5)
ERYTHROCYTE [DISTWIDTH] IN BLOOD BY AUTOMATED COUNT: 17.2 % (ref 11.6–14.5)
GLUCOSE SERPL-MCNC: 90 MG/DL (ref 74–99)
HCT VFR BLD AUTO: 30.5 % (ref 35–45)
HGB BLD-MCNC: 9.1 G/DL (ref 12–16)
INR PPP: 1 (ref 0.8–1.2)
LYMPHOCYTES # BLD: 0.8 K/UL (ref 0.9–3.6)
LYMPHOCYTES NFR BLD: 15 % (ref 21–52)
MCH RBC QN AUTO: 30 PG (ref 24–34)
MCHC RBC AUTO-ENTMCNC: 29.8 G/DL (ref 31–37)
MCV RBC AUTO: 100.7 FL (ref 74–97)
MONOCYTES # BLD: 0.4 K/UL (ref 0.05–1.2)
MONOCYTES NFR BLD: 7 % (ref 3–10)
NEUTS SEG # BLD: 4 K/UL (ref 1.8–8)
NEUTS SEG NFR BLD: 77 % (ref 40–73)
PLATELET # BLD AUTO: 177 K/UL (ref 135–420)
PMV BLD AUTO: 9.2 FL (ref 9.2–11.8)
POTASSIUM SERPL-SCNC: 4.1 MMOL/L (ref 3.5–5.5)
PROTHROMBIN TIME: 12.8 SEC (ref 11.5–15.2)
RBC # BLD AUTO: 3.03 M/UL (ref 4.2–5.3)
SODIUM SERPL-SCNC: 143 MMOL/L (ref 136–145)
WBC # BLD AUTO: 5.1 K/UL (ref 4.6–13.2)

## 2021-04-17 PROCEDURE — 93926 LOWER EXTREMITY STUDY: CPT

## 2021-04-17 PROCEDURE — 80048 BASIC METABOLIC PNL TOTAL CA: CPT

## 2021-04-17 PROCEDURE — 85025 COMPLETE CBC W/AUTO DIFF WBC: CPT

## 2021-04-17 PROCEDURE — 99285 EMERGENCY DEPT VISIT HI MDM: CPT

## 2021-04-17 PROCEDURE — 93971 EXTREMITY STUDY: CPT

## 2021-04-17 PROCEDURE — 85610 PROTHROMBIN TIME: CPT

## 2021-04-17 PROCEDURE — 73590 X-RAY EXAM OF LOWER LEG: CPT

## 2021-04-17 PROCEDURE — 96374 THER/PROPH/DIAG INJ IV PUSH: CPT

## 2021-04-17 PROCEDURE — 74011250636 HC RX REV CODE- 250/636: Performed by: EMERGENCY MEDICINE

## 2021-04-17 PROCEDURE — 96375 TX/PRO/DX INJ NEW DRUG ADDON: CPT

## 2021-04-17 PROCEDURE — 74011000250 HC RX REV CODE- 250: Performed by: EMERGENCY MEDICINE

## 2021-04-17 PROCEDURE — 74011250637 HC RX REV CODE- 250/637: Performed by: EMERGENCY MEDICINE

## 2021-04-17 PROCEDURE — 96361 HYDRATE IV INFUSION ADD-ON: CPT

## 2021-04-17 PROCEDURE — 82550 ASSAY OF CK (CPK): CPT

## 2021-04-17 PROCEDURE — 85730 THROMBOPLASTIN TIME PARTIAL: CPT

## 2021-04-17 RX ORDER — OXYCODONE HYDROCHLORIDE 5 MG/1
5 CAPSULE ORAL
Qty: 6 CAP | Refills: 0 | Status: SHIPPED | OUTPATIENT
Start: 2021-04-17 | End: 2021-04-19

## 2021-04-17 RX ORDER — AMLODIPINE BESYLATE 5 MG/1
5 TABLET ORAL
Status: COMPLETED | OUTPATIENT
Start: 2021-04-17 | End: 2021-04-17

## 2021-04-17 RX ORDER — DOXYCYCLINE HYCLATE 100 MG
100 TABLET ORAL 2 TIMES DAILY
Qty: 14 TAB | Refills: 0 | Status: SHIPPED | OUTPATIENT
Start: 2021-04-17 | End: 2021-04-24

## 2021-04-17 RX ORDER — VALACYCLOVIR HYDROCHLORIDE 500 MG/1
500 TABLET, FILM COATED ORAL
Status: COMPLETED | OUTPATIENT
Start: 2021-04-17 | End: 2021-04-17

## 2021-04-17 RX ORDER — GABAPENTIN 100 MG/1
100 CAPSULE ORAL 3 TIMES DAILY
Status: DISCONTINUED | OUTPATIENT
Start: 2021-04-17 | End: 2021-04-17 | Stop reason: HOSPADM

## 2021-04-17 RX ORDER — DILTIAZEM HYDROCHLORIDE 5 MG/ML
5 INJECTION INTRAVENOUS
Status: COMPLETED | OUTPATIENT
Start: 2021-04-17 | End: 2021-04-17

## 2021-04-17 RX ORDER — DOXYCYCLINE 100 MG/1
100 CAPSULE ORAL
Status: COMPLETED | OUTPATIENT
Start: 2021-04-17 | End: 2021-04-17

## 2021-04-17 RX ORDER — MORPHINE SULFATE 2 MG/ML
2 INJECTION, SOLUTION INTRAMUSCULAR; INTRAVENOUS
Status: COMPLETED | OUTPATIENT
Start: 2021-04-17 | End: 2021-04-17

## 2021-04-17 RX ORDER — VALACYCLOVIR HYDROCHLORIDE 1 G/1
1000 TABLET, FILM COATED ORAL 3 TIMES DAILY
Qty: 21 TAB | Refills: 0 | Status: SHIPPED | OUTPATIENT
Start: 2021-04-17 | End: 2021-04-24

## 2021-04-17 RX ADMIN — SODIUM CHLORIDE 500 ML: 900 INJECTION, SOLUTION INTRAVENOUS at 18:50

## 2021-04-17 RX ADMIN — MORPHINE SULFATE 2 MG: 2 INJECTION, SOLUTION INTRAMUSCULAR; INTRAVENOUS at 16:32

## 2021-04-17 RX ADMIN — AMLODIPINE BESYLATE 5 MG: 5 TABLET ORAL at 18:51

## 2021-04-17 RX ADMIN — DOXYCYCLINE 100 MG: 100 CAPSULE ORAL at 18:50

## 2021-04-17 RX ADMIN — DILTIAZEM HYDROCHLORIDE 5 MG: 5 INJECTION INTRAVENOUS at 19:48

## 2021-04-17 RX ADMIN — VALACYCLOVIR HYDROCHLORIDE 500 MG: 500 TABLET, FILM COATED ORAL at 18:51

## 2021-04-17 NOTE — ED NOTES
[facility] Patient in NAD. VSS. AOX4. Bed low and locked. Call bell within reach. Awaiting MD disposition.

## 2021-04-17 NOTE — DISCHARGE INSTRUCTIONS
Take doxycycline as directed. Take Valtrex as directed. Continue taking your gabapentin. Take Tylenol as needed for mild to moderate pain. Take oxycodone only as needed for severe pain. Do not drive, operate machinery, or drink alcohol while taking. Follow-up outpatient with vascular. Their information has been provided to you. Follow-up with primary care. Consult your OB/GYN prior to surgery to inform them of your shingles and cellulitis diagnoses. Return to the emergency department if you experience any fever, chills, chest pain, shortness of breath, numbness, tingling, weakness, color changes, spread of the vesicular rash (shingles) to the other side of your body or to additional parts of your body, or any other concerns.

## 2021-04-17 NOTE — ED TRIAGE NOTES
Pt c/o lt leg pain, onset 3 days ago, denies injury, pt states she has a stent in her lt groin, concerned about the pain

## 2021-04-18 NOTE — ED NOTES
I have reviewed discharge instructions with the patient. The patient verbalized understanding. Discharge medications reviewed with patient and appropriate educational materials and side effects teaching were provided. NAD. Provider aware of hypertensive BP. All other VS stable. Provider gives OK for DC. Patient with no complaints at this time. AOX4. Ambulatory with steady gait. Easy WOB.

## 2021-04-18 NOTE — ED PROVIDER NOTES
EMERGENCY DEPARTMENT HISTORY AND PHYSICAL EXAM    Date: 4/17/2021  Patient Name: Georgia Capellan    History of Presenting Illness     Chief Complaint   Patient presents with    Leg Pain         History Provided By: Patient    Logan Song 27 is a 78 y.o. female with PMHX of TIA, hyperlipidemia, hypertension, peripheral arterial disease with left femoral stent who presents to the emergency department C/O left lower extremity pain, swelling ongoing for the past 3 days. She denies any trauma, injury or falls. States that she has a stent in her left groin however this pain does not feel similar to when she needed a stent. She denies any fever, chills, chest pain, shortness of breath, numbness, weakness. She is reporting pain and swelling to the lower part of her left leg. Also reporting that this morning she noticed a rash around her left knee. Reports that shooting pain going from the left knee towards the left thigh with no thigh pain or swelling. She is pending hysterectomy on Tuesday with Dr. Lauryn Ferrera and has been giving herself heparin subcutaneously in place of her Plavix in preparation for the surgery. No history of shingles. PCP: Brock De Paz, DO    Current Outpatient Medications   Medication Sig Dispense Refill    doxycycline (VIBRA-TABS) 100 mg tablet Take 1 Tab by mouth two (2) times a day for 7 days. 14 Tab 0    valACYclovir (VALTREX) 1 gram tablet Take 1 Tab by mouth three (3) times daily for 7 days. 21 Tab 0    oxyCODONE (OXYIR) 5 mg capsule Take 1 Cap by mouth every six (6) hours as needed for Pain for up to 6 doses. Max Daily Amount: 20 mg. Take only as needed for severe pain 6 Cap 0    clopidogreL (Plavix) 75 mg tab Take  by mouth.  calcium carbonate (OS-FACUNDO) 500 mg calcium (1,250 mg) tablet Take 3 Tabs by mouth three (3) times daily (with meals). (Patient taking differently: Take 3 Tabs by mouth daily.  600 mg) 75 Tab 0    levothyroxine (SYNTHROID) 100 mcg tablet Take 1 Tab by mouth daily. 90 Tab 0    omeprazole (PRILOSEC) 40 mg capsule Take 40 mg by mouth daily.  Magnesium Oxide 500 mg cap Take  by mouth daily.  gabapentin (NEURONTIN) 100 mg capsule Take 100 mg by mouth three (3) times daily.  docusate sodium (COLACE) 100 mg capsule Take 100 mg by mouth daily.  cyanocobalamin 1,000 mcg tablet Take 1,000 mcg by mouth daily.  carvediloL (COREG) 6.25 mg tablet Take 6.25 mg by mouth two (2) times daily (with meals).  calcium-vitamin D3-vitamin K 500 mg-1,000 unit-40 mcg chew Take 1 Tab by mouth daily.  atorvastatin (LIPITOR) 40 mg tablet Take 40 mg by mouth nightly.  amLODIPine (NORVASC) 5 mg tablet Take 5 mg by mouth daily. Past History     Past Medical History:  Past Medical History:   Diagnosis Date    CAD (coronary artery disease)     stents in legs    GERD (gastroesophageal reflux disease)     Hypercholesteremia     Hypertension     age 38    Psychiatric disorder     anxiety and depression    Stroke ([facility] [address]     tia *4       Past Surgical History:  Past Surgical History:   Procedure Laterality Date    HX APPENDECTOMY      HX CHOLECYSTECTOMY      HX ORTHOPAEDIC      neck fabian    HX TONSILLECTOMY      HX TUBAL LIGATION      VASCULAR SURGERY PROCEDURE UNLIST      stents in legs       Family History:  History reviewed. No pertinent family history. Social History:  Social History     Tobacco Use    Smoking status: Former Smoker    Smokeless tobacco: Never Used   Substance Use Topics    Alcohol use: Never     Frequency: Never    Drug use: Never       Allergies: Allergies   Allergen Reactions    Sulfamethoxazole Other (comments)    Trimethoprim Other (comments)    Promethazine Other (comments)     Makes me crazy         Review of Systems   Review of Systems   Constitutional: Negative for chills and fever. Respiratory: Negative for shortness of breath. Cardiovascular: Negative for chest pain.    Gastrointestinal: Negative for abdominal pain, nausea and vomiting. Skin: Positive for rash. Neurological: Negative for dizziness, weakness, light-headedness, numbness and headaches. All other systems reviewed and are negative. Physical Exam     Vitals:    04/17/21 1948 04/17/21 1952 04/17/21 1952 04/17/21 2008   BP: (!) 217/117 (!) 217/117 (!) 150/101 (!) 183/100   Pulse: 88 86  83   Resp:  18  16   Temp:       SpO2:  98%  99%   Weight:       Height:         Physical Exam    Nursing notes and vital signs reviewed    Constitutional: Non toxic appearing, resting comfortably in no acute distress  Head: Normocephalic, Atraumatic  Eyes: EOMI  Neck: Supple  Cardiovascular: Regular rate and rhythm, no murmurs, rubs, or gallops  Chest: Normal work of breathing and chest excursion bilaterally  Lungs: Clear to ausculation bilaterally  Abdomen: Soft, non tender, non distended, normoactive bowel sounds  Back: No evidence of trauma or deformity  Extremities: No evidence of trauma or deformity. Left lower extremity: Able to palpate PT/DP pulses, overall slightly diminished, 2+ femoral pulse. Full range of motion of the lower extremity at the hip, knee, ankle, feet, neurovascular intact. Warmth, tenderness, mild erythema with swelling appreciated to the left lower extremity, no streaking. No thigh tenderness. Skin: Warm and dry, normal cap refill. Small vesicular rash around the left knee.   Neuro: Alert and appropriate,  normal speech, strength and sensation full and symmetric bilaterally, normal gait, normal coordination  Psychiatric: Normal mood and affect      Diagnostic Study Results     Labs -     Recent Results (from the past 12 hour(s))   DUPLEX LOWER EXT ARTERY LEFT    Collection Time: 04/17/21  3:58 PM   Result Value Ref Range    Left CFA prox sys .8 cm/s    Left Prox PFA A PSV 17.9 cm/s    Left popliteal dist sys .5 cm/s    Left popliteal mid sys PSV 32.4 cm/s    Left Dist PTA PSV 74.6 cm/s    Left Dist Peroneal Velocity 43.9 cm/s    Left Dist SANDRA Velocity 50.5 cm/s    Left Pop A Dist Edwin Ratio 3.75    CBC WITH AUTOMATED DIFF    Collection Time: 04/17/21  4:34 PM   Result Value Ref Range    WBC 5.1 4.6 - 13.2 K/uL    RBC 3.03 (L) 4.20 - 5.30 M/uL    HGB 9.1 (L) 12.0 - 16.0 g/dL    HCT 30.5 (L) 35.0 - 45.0 %    .7 (H) 74.0 - 97.0 FL    MCH 30.0 24.0 - 34.0 PG    MCHC 29.8 (L) 31.0 - 37.0 g/dL    RDW 17.2 (H) 11.6 - 14.5 %    PLATELET 274 510 - 845 K/uL    MPV 9.2 9.2 - 11.8 FL    NEUTROPHILS 77 (H) 40 - 73 %    LYMPHOCYTES 15 (L) 21 - 52 %    MONOCYTES 7 3 - 10 %    EOSINOPHILS 1 0 - 5 %    BASOPHILS 0 0 - 2 %    ABS. NEUTROPHILS 4.0 1.8 - 8.0 K/UL    ABS. LYMPHOCYTES 0.8 (L) 0.9 - 3.6 K/UL    ABS. MONOCYTES 0.4 0.05 - 1.2 K/UL    ABS. EOSINOPHILS 0.0 0.0 - 0.4 K/UL    ABS. BASOPHILS 0.0 0.0 - 0.1 K/UL    DF AUTOMATED     METABOLIC PANEL, BASIC    Collection Time: 04/17/21  4:34 PM   Result Value Ref Range    Sodium 143 136 - 145 mmol/L    Potassium 4.1 3.5 - 5.5 mmol/L    Chloride 111 100 - 111 mmol/L    CO2 25 21 - 32 mmol/L    Anion gap 7 3.0 - 18 mmol/L    Glucose 90 74 - 99 mg/dL    BUN 28 (H) 7.0 - 18 MG/DL    Creatinine 1.42 (H) 0.6 - 1.3 MG/DL    BUN/Creatinine ratio 20 12 - 20      GFR est AA 44 (L) >60 ml/min/1.73m2    GFR est non-AA 36 (L) >60 ml/min/1.73m2    Calcium 6.8 (L) 8.5 - 10.1 MG/DL   PROTHROMBIN TIME + INR    Collection Time: 04/17/21  4:34 PM   Result Value Ref Range    Prothrombin time 12.8 11.5 - 15.2 sec    INR 1.0 0.8 - 1.2     PTT    Collection Time: 04/17/21  4:34 PM   Result Value Ref Range    aPTT 40.7 (H) 23.0 - 36.4 SEC   CK    Collection Time: 04/17/21  4:34 PM   Result Value Ref Range     26 - 192 U/L       Radiologic Studies -   XR TIB/FIB LT   Final Result   No acute fractures or subluxation of left tibia and fibula. Edema   left lower extremity. Severe calcific atherosclerotic present.          CT Results  (Last 48 hours)    None        CXR Results  (Last 48 hours)    None Medications given in the ED-  Medications   morphine injection 2 mg (2 mg IntraVENous Given 4/17/21 1632)   sodium chloride 0.9 % bolus infusion 500 mL (0 mL IntraVENous IV Completed 4/17/21 2008)   doxycycline (MONODOX) capsule 100 mg (100 mg Oral Given 4/17/21 1850)   valACYclovir (VALTREX) tablet 500 mg (500 mg Oral Given 4/17/21 1851)   amLODIPine (NORVASC) tablet 5 mg (5 mg Oral Given 4/17/21 1851)   dilTIAZem (CARDIZEM) injection 5 mg (5 mg IntraVENous Given 4/17/21 1948)         Medical Decision Making   I am the first provider for this patient. I reviewed the vital signs, available nursing notes, past medical history, past surgical history, family history and social history. Vital Signs-Reviewed the patient's vital signs. Pulse Oximetry Analysis - 99% on room air, not hypoxic     Cardiac Monitor:  Rate: 83 bpm  Rhythm: Sinus    Records Reviewed: Nursing Notes and Old Medical Records    Provider Notes (Medical Decision Making): Kathleen Claire is a 78 y.o. female with history of  peripheral vascular disease status post left femoral patch angioplasty, right femoral artery to profunda bypass with sequential bypass to SFA, aortogram with iliac artery angiogram with iliac artery angioplasty and stent bilateral 6/2019 [facility], hypertension, TIA, hyperlipidemia presenting with left lower extremity pain and swelling ongoing for past 3 days with newly noted rash on left knee today. On exam of the left lower extremity able to palpate PT/DP pulses, overall slightly diminished, 2+ femoral pulse. Full range of motion of the lower extremity at the hip, knee, ankle, feet, neurovascular intact. Warmth, tenderness, mild erythema with swelling appreciated to the left lower extremity, no streaking. No thigh tenderness. Small vesicular rash around knee. Suspect DVT versus peripheral vascular disease versus cellulitis. Also with a component of shingles.   We will plan for x-ray, basic labs, pain control, arterial and venous duplex studies, and reassess. Procedures:  Procedures    ED Course:   No leukocytosis. Mild dehydration with creatinine 1.42. Normal CK. X-ray with no fractures. DVT study negative. Arterial study demonstrating:Patent left common femoral to popliteal bypass graft noted with triphasic waveforms throughout. Monophasic waveforms in the left profunda femoral arterty. Elevated velocities in the left distal popliteal artery consistent with stenosis greater than 50%. [id number] will consult with vascular. We will hydrate, give Valtrex for shingles, doxycycline for cellulitis while waiting vascular call back. Patient declined gabapentin as she already takes 3 times daily. Blood pressure noted to be elevated. Will give home dose of amlodipine. Patient asymptomatic with no chest pain, shortness of breath, dizziness, lightheadedness, or any other symptoms. Currently denying pain. Received initial call back from vascular, Dr. Shantal Anand. Reviewed imaging findings. No acute abnormalities noted. She is leaving another site and will review previous PVL studies on this patient and will give a call back. Blood pressure remains elevated, will give Cardizem IV x1. Patient remains asymptomatic.  0710 received a call back from Dr. Shantal Anand. Similar findings compared to previous studies. Patient suited for outpatient management. Instructed to call office for follow-up. 3928 diastolic BP of 288. Patient asymptomatic and suited for discharge at this time. Patient to be discharged with Valtrex, Doxy, to continue gabapentin. Tylenol for mild to moderate pain. Oxycodone for severe pain. Sedation precautions. Instructed for outpatient vascular follow-up. Instructed to call her gynecologist on Monday and inform them of diagnosis of shingles as well as cellulitis and current medications.   Strict return precautions given if patient experiences any numbness, tingling, weakness, color changes, worsening pain or swelling, shingles rash which crosses midline or extends to 2 or more dermatomal distributions, or any other concerns. In agreement with discharge plan and return precautions. Instructed to take blood pressure medications as directed. Diagnosis and Disposition       DISCHARGE NOTE:    Jessi Hines's  results have been reviewed with her. She has been counseled regarding her diagnosis, treatment, and plan. She verbally conveys understanding and agreement of the signs, symptoms, diagnosis, treatment and prognosis and additionally agrees to follow up as discussed. She also agrees with the care-plan and conveys that all of her questions have been answered. I have also provided discharge instructions for her that include: educational information regarding their diagnosis and treatment, and list of reasons why they would want to return to the ED prior to their follow-up appointment, should her condition change. She has been provided with education for proper emergency department utilization. CLINICAL IMPRESSION:    1. Cellulitis of left lower extremity    2. Herpes zoster without complication    3. PVD (peripheral vascular disease) ([address]        PLAN:  1. D/C Home  2. Discharge Medication List as of 4/17/2021  7:16 PM      START taking these medications    Details   doxycycline (VIBRA-TABS) 100 mg tablet Take 1 Tab by mouth two (2) times a day for 7 days. , Normal, Disp-14 Tab, R-0      valACYclovir (VALTREX) 1 gram tablet Take 1 Tab by mouth three (3) times daily for 7 days. , Normal, Disp-21 Tab, R-0      oxyCODONE (OXYIR) 5 mg capsule Take 1 Cap by mouth every six (6) hours as needed for Pain for up to 6 doses. Max Daily Amount: 20 mg.  Take only as needed for severe pain, Normal, Disp-6 Cap, R-0         CONTINUE these medications which have NOT CHANGED    Details   clopidogreL (Plavix) 75 mg tab Take  by mouth., Historical Med      calcium carbonate (OS-FACUNDO) 500 mg calcium (1,250 mg) tablet Take 3 Tabs by mouth three (3) times daily (with meals). , Print, Disp-75 Tab, R-0      levothyroxine (SYNTHROID) 100 mcg tablet Take 1 Tab by mouth daily. , Print, Disp-90 Tab, R-0      omeprazole (PRILOSEC) 40 mg capsule Take 40 mg by mouth daily. , Historical Med      Magnesium Oxide 500 mg cap Take  by mouth daily. , Historical Med      gabapentin (NEURONTIN) 100 mg capsule Take 100 mg by mouth three (3) times daily. , Historical Med      docusate sodium (COLACE) 100 mg capsule Take 100 mg by mouth daily. , Historical Med      cyanocobalamin 1,000 mcg tablet Take 1,000 mcg by mouth daily. , Historical Med      carvediloL (COREG) 6.25 mg tablet Take 6.25 mg by mouth two (2) times daily (with meals). , Historical Med      calcium-vitamin D3-vitamin K 500 mg-1,000 unit-40 mcg chew Take 1 Tab by mouth daily. , Historical Med      atorvastatin (LIPITOR) 40 mg tablet Take 40 mg by mouth nightly., Historical Med      amLODIPine (NORVASC) 5 mg tablet Take 5 mg by mouth daily. , Historical Med           3. Follow-up Information     Follow up With Specialties Details Why Contact Info    Jerry Mora DO Family Medicine Schedule an appointment as soon as possible for a visit   [address] [id number] [phone]      [facility] EMERGENCY DEPT Emergency Medicine  If symptoms worsen [address] [place]  [phone]    Jacquelyn Alonso MD Vascular Surgery Schedule an appointment as soon as possible for a visit   [place] [address]  [address]  [phone]          _______________________________      Please note that this dictation was completed with [employer], the [employer] voice recognition software. Quite often unanticipated grammatical, syntax, homophones, and other interpretive errors are inadvertently transcribed by the computer software. Please disregard these errors. Please excuse any errors that have escaped final proofreading.

## 2021-04-19 LAB
LEFT CFA PROX SYS PSV: 112.8 CM/S
LEFT DIST ATA VELOCITY: 50.5 CM/S
LEFT DIST PTA PSV: 74.6 CM/S
LEFT PERONEAL DIST VELOCITY: 43.9 CM/S
LEFT POP A DIST VEL RATIO: 3.75
LEFT POPLITEAL DIST SYS PSV: 121.5 CM/S
LEFT POPLITEAL MID SYS PSV: 32.4 CM/S
LEFT PROX PFA A PSV: 17.9 CM/S

## (undated) DEVICE — 3-0 COATED VICRYL PLUS UNDYED 1X27" SH --

## (undated) DEVICE — SHEAR HARMONIC FOCUS OEM 9CM --

## (undated) DEVICE — GARMENT,MEDLINE,DVT,INT,CALF,MED, GEN2: Brand: MEDLINE

## (undated) DEVICE — DRAIN SURG 10FR L1/8IN DIA3.2MM SIL CHN RND HUBLESS FULL

## (undated) DEVICE — SOL IRRIGATION INJ NACL 0.9% 500ML BTL

## (undated) DEVICE — RESERVOIR,SUCTION,100CC,SILICONE: Brand: MEDLINE

## (undated) DEVICE — STERILE POLYISOPRENE POWDER-FREE SURGICAL GLOVES: Brand: PROTEXIS

## (undated) DEVICE — ENT PACK: Brand: MEDLINE INDUSTRIES, INC.

## (undated) DEVICE — STRAP,POSITIONING,KNEE/BODY,FOAM,4X60": Brand: MEDLINE

## (undated) DEVICE — SUTURE NONABSORBABLE MONOFILAMENT 5-0 PS-2 18 IN BLK ETHILON 1666H

## (undated) DEVICE — APPLIER CLP L9.375IN APER 2.1MM CLS L3.8MM 20 SM TI CLP

## (undated) DEVICE — SUT SLK 3-0 30IN SH BLK --

## (undated) DEVICE — 3M™ STERI-DRAPE™ INSTRUMENT POUCH 1018: Brand: STERI-DRAPE™

## (undated) DEVICE — MEDI-VAC SUCTION HANDLE REGULAR CAPACITY: Brand: CARDINAL HEALTH

## (undated) DEVICE — ROUND DISSECTORS: Brand: DEROYAL

## (undated) DEVICE — MAGNETIC INSTRUMENT PAD 10" X 16"; MEDIUM; DISPOSABLE: Brand: CARDINAL HEALTH

## (undated) DEVICE — SPONGE LAP 18X18IN STRL -- 5/PK

## (undated) DEVICE — TRAY PREP DRY W/ PREM GLV 2 APPL 6 SPNG 2 UNDPD 1 OVERWRAP

## (undated) DEVICE — Device

## (undated) DEVICE — APPLIER CLP M L11IN TI MULT RNG HNDL 30 CLP STR LIGACLP

## (undated) DEVICE — SUTURE PERMAHAND SZ 2-0 L30IN NONABSORBABLE BLK SILK W/O A305H